# Patient Record
Sex: MALE | Race: WHITE | Employment: FULL TIME | URBAN - METROPOLITAN AREA
[De-identification: names, ages, dates, MRNs, and addresses within clinical notes are randomized per-mention and may not be internally consistent; named-entity substitution may affect disease eponyms.]

---

## 2018-08-19 ENCOUNTER — HOSPITAL ENCOUNTER (OUTPATIENT)
Age: 38
Discharge: HOME | End: 2018-08-19
Attending: EMERGENCY MEDICINE
Payer: COMMERCIAL

## 2018-08-19 VITALS
BODY MASS INDEX: 42.66 KG/M2 | RESPIRATION RATE: 20 BRPM | WEIGHT: 315 LBS | OXYGEN SATURATION: 100 % | HEART RATE: 92 BPM | DIASTOLIC BLOOD PRESSURE: 71 MMHG | HEIGHT: 72 IN | SYSTOLIC BLOOD PRESSURE: 151 MMHG | TEMPERATURE: 98 F

## 2018-08-19 DIAGNOSIS — L03.019 ONYCHIA AND PARONYCHIA OF FINGER: Primary | ICD-10-CM

## 2018-08-19 PROCEDURE — 10060 I&D ABSCESS SIMPLE/SINGLE: CPT

## 2018-08-19 PROCEDURE — 87070 CULTURE OTHR SPECIMN AEROBIC: CPT | Performed by: EMERGENCY MEDICINE

## 2018-08-19 PROCEDURE — 87147 CULTURE TYPE IMMUNOLOGIC: CPT | Performed by: EMERGENCY MEDICINE

## 2018-08-19 PROCEDURE — 99213 OFFICE O/P EST LOW 20 MIN: CPT

## 2018-08-19 PROCEDURE — 87205 SMEAR GRAM STAIN: CPT | Performed by: EMERGENCY MEDICINE

## 2018-08-19 PROCEDURE — 99214 OFFICE O/P EST MOD 30 MIN: CPT

## 2018-08-19 PROCEDURE — 87186 SC STD MICRODIL/AGAR DIL: CPT | Performed by: EMERGENCY MEDICINE

## 2018-08-19 RX ORDER — AMLODIPINE BESYLATE 10 MG/1
10 TABLET ORAL DAILY
COMMUNITY

## 2018-08-19 RX ORDER — CEFADROXIL 500 MG/1
500 CAPSULE ORAL 2 TIMES DAILY
Qty: 20 CAPSULE | Refills: 0 | Status: SHIPPED | OUTPATIENT
Start: 2018-08-19 | End: 2018-08-29

## 2018-08-19 NOTE — ED INITIAL ASSESSMENT (HPI)
PATIENT ARRIVED AMBULATORY TO ROOM C/O A PARONYCHIA TO THE 2ND DIGIT ON THE LEFT FINGER. NO DRAINAGE. NO FEVERS.

## 2018-08-19 NOTE — ED PROVIDER NOTES
Patient Seen in: [facility]    History   Patient presents with:  Finger Pain    Stated Complaint: Skin problem    HPI    Patient complains of a painful swollen left index finger.   The patient denies any recent trauma to t was placed    MDM   We will place the patient on a 10 day course of cephalosporin antibiotic as patient does have erythema and swelling proximal to the nail fold over  the distal phalanx and may have a early mild finger cellulitis present            Dispos

## 2018-09-04 ENCOUNTER — HOSPITAL ENCOUNTER (OUTPATIENT)
Age: 38
Discharge: HOME | End: 2018-09-04
Attending: EMERGENCY MEDICINE
Payer: COMMERCIAL

## 2018-09-04 VITALS
SYSTOLIC BLOOD PRESSURE: 127 MMHG | HEART RATE: 100 BPM | TEMPERATURE: 99 F | DIASTOLIC BLOOD PRESSURE: 66 MMHG | BODY MASS INDEX: 42.66 KG/M2 | OXYGEN SATURATION: 96 % | WEIGHT: 315 LBS | RESPIRATION RATE: 20 BRPM | HEIGHT: 72 IN

## 2018-09-04 DIAGNOSIS — E11.65 TYPE 2 DIABETES MELLITUS WITH HYPERGLYCEMIA, WITHOUT LONG-TERM CURRENT USE OF INSULIN (HCC): ICD-10-CM

## 2018-09-04 DIAGNOSIS — J02.0 STREP PHARYNGITIS: Primary | ICD-10-CM

## 2018-09-04 LAB
GLUCOSE BLDC GLUCOMTR-MCNC: 219 MG/DL (ref 70–99)
S PYO AG THROAT QL: POSITIVE

## 2018-09-04 PROCEDURE — 82962 GLUCOSE BLOOD TEST: CPT

## 2018-09-04 PROCEDURE — 99213 OFFICE O/P EST LOW 20 MIN: CPT

## 2018-09-04 PROCEDURE — 99214 OFFICE O/P EST MOD 30 MIN: CPT

## 2018-09-04 PROCEDURE — 87430 STREP A AG IA: CPT

## 2018-09-04 RX ORDER — AMOXICILLIN 875 MG/1
875 TABLET, COATED ORAL 2 TIMES DAILY
Qty: 20 TABLET | Refills: 0 | Status: SHIPPED | OUTPATIENT
Start: 2018-09-04 | End: 2018-09-14

## 2018-09-04 RX ORDER — HYDROCHLOROTHIAZIDE 12.5 MG/1
12.5 TABLET ORAL DAILY
COMMUNITY

## 2018-09-04 RX ORDER — IRBESARTAN 150 MG/1
150 TABLET ORAL DAILY
COMMUNITY

## 2018-09-04 NOTE — ED PROVIDER NOTES
Patient Seen in: [facility]    History   Patient presents with:  Cough/URI    Stated Complaint: COUGH/CONGESTION    HPI    The patient is a 26-year-old male with past history of hypertension, diabetes who presents now wit and rhythm without murmur  Abdomen: Soft, nontender and nondistended  Neurologic: Patient is awake, alert and oriented ×3.   The patient's motor strength is 5 out of 5 and symmetric in the upper and lower extremities bilaterally  Extremities: No focal swell

## 2022-09-24 ENCOUNTER — HOSPITAL ENCOUNTER (OUTPATIENT)
Age: 42
Discharge: OTHER INSTITUTION | End: 2022-09-24

## 2022-09-24 VITALS
OXYGEN SATURATION: 98 % | RESPIRATION RATE: 18 BRPM | TEMPERATURE: 98 F | DIASTOLIC BLOOD PRESSURE: 71 MMHG | SYSTOLIC BLOOD PRESSURE: 155 MMHG | HEART RATE: 102 BPM

## 2022-09-24 DIAGNOSIS — L03.115 CELLULITIS OF RIGHT LOWER EXTREMITY: Primary | ICD-10-CM

## 2022-09-24 PROCEDURE — 99204 OFFICE O/P NEW MOD 45 MIN: CPT

## 2022-09-24 PROCEDURE — 99203 OFFICE O/P NEW LOW 30 MIN: CPT

## 2022-09-24 NOTE — ED INITIAL ASSESSMENT (HPI)
Patient reports he was started on doxycycline on Wednesday for a staph infection to right lower leg. Noted palms, feet and lip itchiness and swelling this am.  Provider at bedside.

## 2024-07-26 ENCOUNTER — HOSPITAL ENCOUNTER (INPATIENT)
Facility: HOSPITAL | Age: 44
LOS: 1 days | Discharge: HOME | End: 2024-07-27
Attending: EMERGENCY MEDICINE | Admitting: HOSPITALIST
Payer: COMMERCIAL

## 2024-07-26 DIAGNOSIS — L03.115 CELLULITIS OF RIGHT LOWER EXTREMITY: Primary | ICD-10-CM

## 2024-07-26 PROBLEM — L03.90 CELLULITIS: Status: ACTIVE | Noted: 2024-07-26

## 2024-07-26 LAB
ALBUMIN SERPL-MCNC: 4.5 G/DL (ref 3.2–4.8)
ALBUMIN/GLOB SERPL: 1.4 {RATIO} (ref 1–2)
ALP LIVER SERPL-CCNC: 74 U/L
ALT SERPL-CCNC: 10 U/L
ANION GAP SERPL CALC-SCNC: 9 MMOL/L (ref 0–18)
AST SERPL-CCNC: 15 U/L (ref ?–34)
BASOPHILS # BLD AUTO: 0.05 X10(3) UL (ref 0–0.2)
BASOPHILS NFR BLD AUTO: 0.2 %
BILIRUB SERPL-MCNC: 0.9 MG/DL (ref 0.3–1.2)
BUN BLD-MCNC: 17 MG/DL (ref 9–23)
BUN/CREAT SERPL: 18.9 (ref 10–20)
CALCIUM BLD-MCNC: 9.5 MG/DL (ref 8.7–10.4)
CHLORIDE SERPL-SCNC: 101 MMOL/L (ref 98–112)
CO2 SERPL-SCNC: 25 MMOL/L (ref 21–32)
CREAT BLD-MCNC: 0.9 MG/DL
DEPRECATED RDW RBC AUTO: 39.9 FL (ref 35.1–46.3)
EGFRCR SERPLBLD CKD-EPI 2021: 108 ML/MIN/1.73M2 (ref 60–?)
EOSINOPHIL # BLD AUTO: 0.01 X10(3) UL (ref 0–0.7)
EOSINOPHIL NFR BLD AUTO: 0 %
ERYTHROCYTE [DISTWIDTH] IN BLOOD BY AUTOMATED COUNT: 13 % (ref 11–15)
GLOBULIN PLAS-MCNC: 3.3 G/DL (ref 2–3.5)
GLUCOSE BLD-MCNC: 175 MG/DL (ref 70–99)
GLUCOSE BLDC GLUCOMTR-MCNC: 213 MG/DL (ref 70–99)
HCT VFR BLD AUTO: 41.8 %
HGB BLD-MCNC: 14.7 G/DL
IMM GRANULOCYTES # BLD AUTO: 0.16 X10(3) UL (ref 0–1)
IMM GRANULOCYTES NFR BLD: 0.6 %
LACTATE SERPL-SCNC: 0.9 MMOL/L (ref 0.5–2)
LYMPHOCYTES # BLD AUTO: 0.92 X10(3) UL (ref 1–4)
LYMPHOCYTES NFR BLD AUTO: 3.5 %
MCH RBC QN AUTO: 29.7 PG (ref 26–34)
MCHC RBC AUTO-ENTMCNC: 35.2 G/DL (ref 31–37)
MCV RBC AUTO: 84.4 FL
MONOCYTES # BLD AUTO: 0.94 X10(3) UL (ref 0.1–1)
MONOCYTES NFR BLD AUTO: 3.6 %
NEUTROPHILS # BLD AUTO: 23.91 X10 (3) UL (ref 1.5–7.7)
NEUTROPHILS # BLD AUTO: 23.91 X10(3) UL (ref 1.5–7.7)
NEUTROPHILS NFR BLD AUTO: 92.1 %
OSMOLALITY SERPL CALC.SUM OF ELEC: 286 MOSM/KG (ref 275–295)
PLATELET # BLD AUTO: 250 10(3)UL (ref 150–450)
POTASSIUM SERPL-SCNC: 3.4 MMOL/L (ref 3.5–5.1)
PROT SERPL-MCNC: 7.8 G/DL (ref 5.7–8.2)
RBC # BLD AUTO: 4.95 X10(6)UL
SODIUM SERPL-SCNC: 135 MMOL/L (ref 136–145)
WBC # BLD AUTO: 26 X10(3) UL (ref 4–11)

## 2024-07-26 PROCEDURE — 99223 1ST HOSP IP/OBS HIGH 75: CPT | Performed by: HOSPITALIST

## 2024-07-26 RX ORDER — PROCHLORPERAZINE EDISYLATE 5 MG/ML
5 INJECTION INTRAMUSCULAR; INTRAVENOUS EVERY 8 HOURS PRN
Status: DISCONTINUED | OUTPATIENT
Start: 2024-07-26 | End: 2024-07-27

## 2024-07-26 RX ORDER — MULTIVIT-MIN/IRON FUM/FOLIC AC 7.5 MG-4
1 TABLET ORAL DAILY
COMMUNITY

## 2024-07-26 RX ORDER — FUROSEMIDE 40 MG/1
40 TABLET ORAL DAILY
COMMUNITY

## 2024-07-26 RX ORDER — NICOTINE POLACRILEX 4 MG
15 LOZENGE BUCCAL
Status: DISCONTINUED | OUTPATIENT
Start: 2024-07-26 | End: 2024-07-27

## 2024-07-26 RX ORDER — BRIMONIDINE TARTRATE 1.5 MG/ML
1 SOLUTION/ DROPS OPHTHALMIC 2 TIMES DAILY
Status: DISCONTINUED | OUTPATIENT
Start: 2024-07-26 | End: 2024-07-27

## 2024-07-26 RX ORDER — DORZOLAMIDE HYDROCHLORIDE AND TIMOLOL MALEATE 20; 5 MG/ML; MG/ML
1 SOLUTION/ DROPS OPHTHALMIC 2 TIMES DAILY
Status: DISCONTINUED | OUTPATIENT
Start: 2024-07-26 | End: 2024-07-27

## 2024-07-26 RX ORDER — VANCOMYCIN 2 GRAM/500 ML IN 0.9 % SODIUM CHLORIDE INTRAVENOUS
2000 ONCE
Status: DISCONTINUED | OUTPATIENT
Start: 2024-07-26 | End: 2024-07-26

## 2024-07-26 RX ORDER — DORZOLAMIDE HYDROCHLORIDE AND TIMOLOL MALEATE 20; 5 MG/ML; MG/ML
1 SOLUTION/ DROPS OPHTHALMIC 2 TIMES DAILY
COMMUNITY

## 2024-07-26 RX ORDER — LATANOPROST 50 UG/ML
1 SOLUTION/ DROPS OPHTHALMIC NIGHTLY
Status: DISCONTINUED | OUTPATIENT
Start: 2024-07-26 | End: 2024-07-27

## 2024-07-26 RX ORDER — ONDANSETRON 2 MG/ML
4 INJECTION INTRAMUSCULAR; INTRAVENOUS EVERY 6 HOURS PRN
Status: DISCONTINUED | OUTPATIENT
Start: 2024-07-26 | End: 2024-07-27

## 2024-07-26 RX ORDER — SODIUM CHLORIDE 9 MG/ML
INJECTION, SOLUTION INTRAVENOUS CONTINUOUS
Status: DISCONTINUED | OUTPATIENT
Start: 2024-07-26 | End: 2024-07-27

## 2024-07-26 RX ORDER — NICOTINE POLACRILEX 4 MG
30 LOZENGE BUCCAL
Status: DISCONTINUED | OUTPATIENT
Start: 2024-07-26 | End: 2024-07-27

## 2024-07-26 RX ORDER — MULTIVIT WITH MINERALS/LUTEIN
1000 TABLET ORAL DAILY
COMMUNITY

## 2024-07-26 RX ORDER — LATANOPROST 50 UG/ML
1 SOLUTION/ DROPS OPHTHALMIC NIGHTLY
COMMUNITY

## 2024-07-26 RX ORDER — SODIUM CHLORIDE 9 MG/ML
125 INJECTION, SOLUTION INTRAVENOUS CONTINUOUS
Status: DISCONTINUED | OUTPATIENT
Start: 2024-07-26 | End: 2024-07-27

## 2024-07-26 RX ORDER — BRIMONIDINE TARTRATE 1.5 MG/ML
1 SOLUTION/ DROPS OPHTHALMIC 2 TIMES DAILY
COMMUNITY

## 2024-07-26 RX ORDER — ACETAMINOPHEN 500 MG
500 TABLET ORAL EVERY 4 HOURS PRN
Status: DISCONTINUED | OUTPATIENT
Start: 2024-07-26 | End: 2024-07-27

## 2024-07-26 RX ORDER — CLINDAMYCIN PHOSPHATE 600 MG/50ML
600 INJECTION, SOLUTION INTRAVENOUS ONCE
Status: COMPLETED | OUTPATIENT
Start: 2024-07-26 | End: 2024-07-26

## 2024-07-26 RX ORDER — POTASSIUM CHLORIDE 20 MEQ/1
40 TABLET, EXTENDED RELEASE ORAL ONCE
Status: COMPLETED | OUTPATIENT
Start: 2024-07-26 | End: 2024-07-26

## 2024-07-26 RX ORDER — TEMAZEPAM 15 MG/1
15 CAPSULE ORAL NIGHTLY PRN
Status: DISCONTINUED | OUTPATIENT
Start: 2024-07-26 | End: 2024-07-27

## 2024-07-26 RX ORDER — DEXTROSE MONOHYDRATE 25 G/50ML
50 INJECTION, SOLUTION INTRAVENOUS
Status: DISCONTINUED | OUTPATIENT
Start: 2024-07-26 | End: 2024-07-27

## 2024-07-26 RX ORDER — CLINDAMYCIN PHOSPHATE 600 MG/50ML
600 INJECTION, SOLUTION INTRAVENOUS EVERY 8 HOURS
Status: DISCONTINUED | OUTPATIENT
Start: 2024-07-27 | End: 2024-07-27

## 2024-07-26 RX ORDER — ENOXAPARIN SODIUM 100 MG/ML
40 INJECTION SUBCUTANEOUS DAILY
Status: DISCONTINUED | OUTPATIENT
Start: 2024-07-27 | End: 2024-07-27

## 2024-07-26 RX ORDER — POTASSIUM CHLORIDE 1500 MG/1
20 TABLET, EXTENDED RELEASE ORAL DAILY
COMMUNITY

## 2024-07-26 NOTE — ED PROVIDER NOTES
Patient Seen in: [facility] Emergency Department      History     Chief Complaint   Patient presents with    Fever    Leg Pain     Stated Complaint: rt leg pain, Hx cellulitis    Subjective:   HPI  43-year-old male with hypertension, diabetes, obesity, history of recurrent lower extremity cellulitis, also with bilateral lower extremity lymphedema, presents for evaluation of fever, right leg pain, warmth and swelling.  Symptoms started earlier today.  He had a fever of 101 Fahrenheit at home and took ibuprofen at 3:45 PM.  He also notes that his heart was racing.  He called his PCP who started him on clindamycin and he took 1 dose today.  He came in for persistent elevated heart rate as he has had sepsis in the past.        Objective:   Past Medical History:    Diabetes (HCC)    Essential hypertension    Glaucoma    High blood pressure              Past Surgical History:   Procedure Laterality Date    Eye surgery      Fracture surgery                  Social History     Socioeconomic History    Marital status:    Tobacco Use    Smoking status: Never    Smokeless tobacco: Never     Social Determinants of Health     Food Insecurity: No Food Insecurity (7/26/2024)    Food Insecurity     Food Insecurity: Never true   Transportation Needs: No Transportation Needs (7/26/2024)    Transportation Needs     Lack of Transportation: No   Housing Stability: Low Risk  (7/26/2024)    Housing Stability     Housing Instability: No              Review of Systems    Positive for stated Chief Complaint: Fever and Leg Pain    Other systems are as noted in HPI.  Constitutional and vital signs reviewed.      All other systems reviewed and negative except as noted above.    Physical Exam     ED Triage Vitals   BP 07/26/24 1739 121/80   Pulse 07/26/24 1736 120   Resp 07/26/24 1736 18   Temp 07/26/24 1736 99.7 °F (37.6 °C)   Temp src 07/26/24 1736 Oral   SpO2 07/26/24 1736 95 %   O2 Device 07/26/24 1736 None (Room air)        Current Vitals:   Vital Signs  BP: 159/85  Pulse: 111  Resp: 20  Temp: 98.6 °F (37 °C)  Temp src: Oral  MAP (mmHg): (!) 106    Oxygen Therapy  SpO2: 95 %  O2 Device: None (Room air)            Physical Exam  Vitals and nursing note reviewed.   Constitutional:       Appearance: He is well-developed.   HENT:      Head: Normocephalic and atraumatic.   Eyes:      Extraocular Movements: Extraocular movements intact.   Cardiovascular:      Rate and Rhythm: Regular rhythm. Tachycardia present.      Heart sounds: Normal heart sounds.   Pulmonary:      Effort: Pulmonary effort is normal.      Breath sounds: Normal breath sounds.   Abdominal:      General: There is no distension.      Palpations: Abdomen is soft.      Tenderness: There is no abdominal tenderness.   Musculoskeletal:         General: Normal range of motion.      Cervical back: Normal range of motion.   Skin:     General: Skin is warm.      Capillary Refill: Capillary refill takes less than 2 seconds.      Comments: At the right posterior distal thigh there is approximately 7 x 5 cm area of erythema and warmth that does not extend to the popliteal fossa   Neurological:      Mental Status: He is alert.      Comments: No focal deficits       Differential diagnosis includes but is not limited to cellulitis, sepsis, bacteremia, less likely DVT        ED Course     Labs Reviewed   COMP METABOLIC PANEL (14) - Abnormal; Notable for the following components:       Result Value    Glucose 175 (*)     Sodium 135 (*)     Potassium 3.4 (*)     All other components within normal limits   POCT GLUCOSE - Abnormal; Notable for the following components:    POC Glucose  213 (*)     All other components within normal limits   CBC W/ DIFFERENTIAL - Abnormal; Notable for the following components:    WBC 26.0 (*)     Neutrophil Absolute Prelim 23.91 (*)     Neutrophil Absolute 23.91 (*)     Lymphocyte Absolute 0.92 (*)     All other components within normal limits   LACTIC  ACID, PLASMA - Normal   CBC WITH DIFFERENTIAL WITH PLATELET    Narrative:     The following orders were created for panel order CBC With Differential With Platelet.  Procedure                               Abnormality         Status                     ---------                               -----------         ------                     CBC W/ DIFFERENTIAL[[id number]]          Abnormal            Final result                 Please view results for these tests on the individual orders.   HEMOGLOBIN A1C   BLOOD CULTURE   BLOOD CULTURE                      MDM                                     Medical Decision Making    Patient well-appearing and ambulatory in the ED.  CMP with hyperglycemia without gap or acidosis.  CBC with significant leukocytosis and left shift.  Lactic acid normal.  Blood cultures pending.  He was treated with fluids and clindamycin as this has improved his cellulitis in the past.  Discussed with Dr. Baires for ID consultation and Dr. Finch for admission to the hospital.      Problems Addressed:  Cellulitis of right lower extremity: complicated acute illness or injury with systemic symptoms    Amount and/or Complexity of Data Reviewed  Labs: ordered. Decision-making details documented in ED Course.  Discussion of management or test interpretation with external provider(s): As above    Risk  Decision regarding hospitalization.        Disposition and Plan     Clinical Impression:  1. Cellulitis of right lower extremity         Disposition:  Admit  7/26/2024  7:30 pm    Follow-up:  No follow-up provider specified.        Medications Prescribed:  Current Discharge Medication List                            Hospital Problems       Present on Admission  Date Reviewed: 9/24/2022            ICD-10-CM Noted POA    * (Principal) Cellulitis of right lower extremity L03.115 7/26/2024 Unknown    Cellulitis L03.90 7/26/2024 Unknown

## 2024-07-26 NOTE — ED INITIAL ASSESSMENT (HPI)
Patient presents to the ED c/o right leg pain, fever, and tachycardia since this morning around 1000. Pt states he checked his pulse at home and it was 118 bpm, pt also reports taking his temperature at home which was 101.  Ibuprofen at 1545.

## 2024-07-27 VITALS
OXYGEN SATURATION: 97 % | RESPIRATION RATE: 18 BRPM | WEIGHT: 315 LBS | TEMPERATURE: 99 F | HEART RATE: 80 BPM | DIASTOLIC BLOOD PRESSURE: 88 MMHG | BODY MASS INDEX: 65 KG/M2 | SYSTOLIC BLOOD PRESSURE: 136 MMHG

## 2024-07-27 LAB
ANION GAP SERPL CALC-SCNC: 6 MMOL/L (ref 0–18)
BASOPHILS # BLD AUTO: 0.02 X10(3) UL (ref 0–0.2)
BASOPHILS NFR BLD AUTO: 0.2 %
BUN BLD-MCNC: 14 MG/DL (ref 9–23)
BUN/CREAT SERPL: 18.7 (ref 10–20)
CALCIUM BLD-MCNC: 8.7 MG/DL (ref 8.7–10.4)
CHLORIDE SERPL-SCNC: 103 MMOL/L (ref 98–112)
CO2 SERPL-SCNC: 27 MMOL/L (ref 21–32)
CREAT BLD-MCNC: 0.75 MG/DL
DEPRECATED RDW RBC AUTO: 40.6 FL (ref 35.1–46.3)
EGFRCR SERPLBLD CKD-EPI 2021: 114 ML/MIN/1.73M2 (ref 60–?)
EOSINOPHIL # BLD AUTO: 0.03 X10(3) UL (ref 0–0.7)
EOSINOPHIL NFR BLD AUTO: 0.3 %
ERYTHROCYTE [DISTWIDTH] IN BLOOD BY AUTOMATED COUNT: 13 % (ref 11–15)
EST. AVERAGE GLUCOSE BLD GHB EST-MCNC: 232 MG/DL (ref 68–126)
GLUCOSE BLD-MCNC: 203 MG/DL (ref 70–99)
GLUCOSE BLDC GLUCOMTR-MCNC: 151 MG/DL (ref 70–99)
GLUCOSE BLDC GLUCOMTR-MCNC: 178 MG/DL (ref 70–99)
HBA1C MFR BLD: 9.7 % (ref ?–5.7)
HCT VFR BLD AUTO: 40.5 %
HGB BLD-MCNC: 14.1 G/DL
IMM GRANULOCYTES # BLD AUTO: 0.05 X10(3) UL (ref 0–1)
IMM GRANULOCYTES NFR BLD: 0.4 %
LYMPHOCYTES # BLD AUTO: 1.35 X10(3) UL (ref 1–4)
LYMPHOCYTES NFR BLD AUTO: 11.8 %
MCH RBC QN AUTO: 29.6 PG (ref 26–34)
MCHC RBC AUTO-ENTMCNC: 34.8 G/DL (ref 31–37)
MCV RBC AUTO: 85.1 FL
MONOCYTES # BLD AUTO: 0.79 X10(3) UL (ref 0.1–1)
MONOCYTES NFR BLD AUTO: 6.9 %
NEUTROPHILS # BLD AUTO: 9.22 X10 (3) UL (ref 1.5–7.7)
NEUTROPHILS # BLD AUTO: 9.22 X10(3) UL (ref 1.5–7.7)
NEUTROPHILS NFR BLD AUTO: 80.4 %
OSMOLALITY SERPL CALC.SUM OF ELEC: 288 MOSM/KG (ref 275–295)
PLATELET # BLD AUTO: 211 10(3)UL (ref 150–450)
POTASSIUM SERPL-SCNC: 3.7 MMOL/L (ref 3.5–5.1)
POTASSIUM SERPL-SCNC: 3.7 MMOL/L (ref 3.5–5.1)
RBC # BLD AUTO: 4.76 X10(6)UL
SODIUM SERPL-SCNC: 136 MMOL/L (ref 136–145)
WBC # BLD AUTO: 11.5 X10(3) UL (ref 4–11)

## 2024-07-27 PROCEDURE — 99239 HOSP IP/OBS DSCHRG MGMT >30: CPT | Performed by: STUDENT IN AN ORGANIZED HEALTH CARE EDUCATION/TRAINING PROGRAM

## 2024-07-27 RX ORDER — POTASSIUM CHLORIDE 20 MEQ/1
20 TABLET, EXTENDED RELEASE ORAL DAILY
Status: DISCONTINUED | OUTPATIENT
Start: 2024-07-27 | End: 2024-07-27

## 2024-07-27 RX ORDER — ENOXAPARIN SODIUM 100 MG/ML
100 INJECTION SUBCUTANEOUS 2 TIMES DAILY
Status: DISCONTINUED | OUTPATIENT
Start: 2024-07-27 | End: 2024-07-27

## 2024-07-27 RX ORDER — ASCORBIC ACID 500 MG
1000 TABLET ORAL DAILY
Status: DISCONTINUED | OUTPATIENT
Start: 2024-07-27 | End: 2024-07-27

## 2024-07-27 RX ORDER — FUROSEMIDE 40 MG/1
40 TABLET ORAL DAILY
Status: DISCONTINUED | OUTPATIENT
Start: 2024-07-27 | End: 2024-07-27

## 2024-07-27 RX ORDER — AMLODIPINE BESYLATE 10 MG/1
10 TABLET ORAL DAILY
Status: DISCONTINUED | OUTPATIENT
Start: 2024-07-27 | End: 2024-07-27

## 2024-07-27 RX ORDER — LOSARTAN POTASSIUM 50 MG/1
50 TABLET ORAL DAILY
Status: DISCONTINUED | OUTPATIENT
Start: 2024-07-27 | End: 2024-07-27

## 2024-07-27 RX ORDER — HYDROCHLOROTHIAZIDE 12.5 MG/1
12.5 TABLET ORAL DAILY
Status: DISCONTINUED | OUTPATIENT
Start: 2024-07-27 | End: 2024-07-27

## 2024-07-27 RX ORDER — ENOXAPARIN SODIUM 150 MG/ML
0.5 INJECTION SUBCUTANEOUS DAILY
Status: DISCONTINUED | OUTPATIENT
Start: 2024-07-27 | End: 2024-07-27

## 2024-07-27 RX ORDER — CLINDAMYCIN HYDROCHLORIDE 300 MG/1
300 CAPSULE ORAL 3 TIMES DAILY
Qty: 30 CAPSULE | Refills: 0 | Status: SHIPPED | OUTPATIENT
Start: 2024-07-27 | End: 2024-08-06

## 2024-07-27 NOTE — H&P
[facility]    PATIENT'S NAME: JF GALLEGOS JR.   ATTENDING PHYSICIAN: Duncan Finch MD   PATIENT ACCOUNT#:   [id number]    LOCATION:  [address] [place] [address]  MEDICAL RECORD #:   [id number]       YOB: 1980  ADMISSION DATE:       07/26/2024    HISTORY AND PHYSICAL EXAMINATION    CHIEF COMPLAINT:  Right lower extremity cellulitis.    HISTORY OF PRESENT ILLNESS:  Patient is a 43-year-old  male with morbid obesity, diabetes mellitus type 2, and chronic stasis dermatitis with recurrent cellulitis, started noticing increased erythema and inflammation in his right distal leg and right medial thigh yesterday.  He was given a prescription for clindamycin which he took the first dose this morning.  He went to work and started having diaphoresis and feeling aches and pains all over his body, came into the emergency department for evaluation.  Temperature was 99.6, pulse was 125, tachycardia.  Lactic acid was negative.  CBC showed white blood cell count of 26.0 with left shift.  Chemistry:  Potassium 3.4, glucose 175.  Blood cultures were obtained.  Started on IV clindamycin.  He will be admitted to the hospital for further management.    PAST MEDICAL HISTORY:  Morbid obesity, diabetes mellitus type 2, chronic stasis dermatitis both lower extremities.  He had history of stasis ulcer with pseudomonas positive culture while he was having an ulcer.  He usually gets recurrent cellulitis in his legs and he says clindamycin usually works for him.     PAST SURGICAL HISTORY:  He had retinal detachment procedure and right ankle open reduction and internal fixation.    MEDICATIONS:  Please see medication reconciliation list.    ALLERGIES:  Shellfish.     FAMILY HISTORY:  Positive for hypertension and diabetes mellitus type 2.    SOCIAL HISTORY:  No tobacco, alcohol, or drug use.  Lives with his family.  Independent in his basic activities of daily living.    REVIEW OF SYSTEMS:  Patient reports  yesterday started developing increased erythema and inflammation in his distal right leg and some streaking in the medial aspect of his right thigh.  He was given a prescription for clindamycin as outlined above.  Today, he felt body aches and subjective fevers and decided to come in to the emergency department for evaluation.      PHYSICAL EXAMINATION:    GENERAL:  Alert and oriented to time, place, and person.  No acute distress.  VITAL SIGNS:  Temperature 99.7, pulse 105, respiratory rate 18, blood pressure 110/72, pulse ox 94% on room air.  HEENT:  Atraumatic.  Oropharynx clear.  Moist mucous membranes.  Ears and nose are normal.  Eyes:  Anicteric sclerae.  NECK:  Supple.  No lymphadenopathy.  Trachea midline.  Full range of motion.  LUNGS:  Clear to auscultation bilaterally.  Normal respiratory effort.  HEART:  Regular rate and rhythm.  S1, S2 auscultated.  No murmur.  ABDOMEN:  Soft, nondistended.  Obese.  Positive bowel sounds.  EXTREMITIES:  Stasis dermatitis both lower extremities.  Right distal leg erythema and mild streaking to the medial and posterior right thigh pannus.   NEUROLOGIC:  Motor and sensory intact.     ASSESSMENT AND PLAN:    1.   Right lower extremity cellulitis.  2.   Leukocytosis, rule out sepsis.  3.   Morbid obesity.  4.   Diabetes mellitus type 2.    Patient will be admitted to general medical floor.  Blood cultures.  IV clindamycin.  Infectious Disease consult.  Monitor Accu-Cheks.  Monitor hemodynamic status.  Further recommendations to follow.    Dictated By Duncan Finch MD  d: 07/26/2024 20:09:21  t: 07/26/2024 22:30:32  Job [id number]  FB/

## 2024-07-27 NOTE — CONSULTS
[facility]  part of [facility] ID CONSULT NOTE    Rajendra Coronado Jr. Patient Status:  Inpatient    1980 MRN [id number]   Location [facility] 5SW/SE Attending Duncan Finch MD   Hosp Day # 1 PCP RUDI HO       Reason for Consultation:  RLE cellulitis    ASSESSMENT:    Antibiotics: Clindamycin    # Acute RLE cellulitis with leukocytosis, R/o bacteremia  # Leukocytosis   # Diabetes mellitus  # Morbid obesity    PLAN:  -  Continue on clindamycin.  -  FU cx.  -  Leg elevation.  -  Follow fever curve, wbc.  -  Reviewed labs, micro, imaging reports, available old records.  -  Case d/w patient, RN.    History of Present Illness:  Rajendra Coronado Jr. is a 43 year old male with a history of morbid obesity, diabetes mellitus, HTN, with history of cellulitis in RLE with last hospitalization 2022 at OSH who presented to [facility] ED on  with fever. Noticed that his right leg was getting more swollen yesterday morning and took a dose of clindamycin but then had a fever at home so came to the ED. On arrival, Tmax 99.7, wbc 26, blood cx obtained, started on clindamycin. States that clindamycin works best for him. Of note, per records from OSH admission 2022, patient was on ancef inpatient and discharged on dalvance. ID consulted.    History:  Past Medical History:    Diabetes (HCC)    Essential hypertension    Glaucoma    High blood pressure     Past Surgical History:   Procedure Laterality Date    Eye surgery      Fracture surgery       Family History   Problem Relation Age of Onset    Heart Disorder Father       reports that he has never smoked. He has never used smokeless tobacco.    Allergies:  Allergies   Allergen Reactions    Shellfish Allergy ANAPHYLAXIS       Medications:    Current Facility-Administered Medications:     sodium chloride 0.9% infusion, 125 mL/hr, Intravenous, Continuous    clindamycin phosphate in dextrose 5% (Cleocin) 600 mg/50mL  IVPB premix 600 mg, 600 mg, Intravenous, Q8H    glucose (Dex4) 15 GM/59ML oral liquid 15 g, 15 g, Oral, Q15 Min PRN **OR** glucose (Glutose) 40% oral gel 15 g, 15 g, Oral, Q15 Min PRN **OR** glucose-vitamin C (Dex-4) chewable tab 4 tablet, 4 tablet, Oral, Q15 Min PRN **OR** dextrose 50% injection 50 mL, 50 mL, Intravenous, Q15 Min PRN **OR** glucose (Dex4) 15 GM/59ML oral liquid 30 g, 30 g, Oral, Q15 Min PRN **OR** glucose (Glutose) 40% oral gel 30 g, 30 g, Oral, Q15 Min PRN **OR** glucose-vitamin C (Dex-4) chewable tab 8 tablet, 8 tablet, Oral, Q15 Min PRN    insulin aspart (NovoLOG) 100 Units/mL FlexPen 1-11 Units, 1-11 Units, Subcutaneous, TID CC    sodium chloride 0.9% infusion, , Intravenous, Continuous    enoxaparin (Lovenox) 40 MG/0.4ML SUBQ injection 40 mg, 40 mg, Subcutaneous, Daily    acetaminophen (Tylenol Extra Strength) tab 500 mg, 500 mg, Oral, Q4H PRN    ondansetron (Zofran) 4 MG/2ML injection 4 mg, 4 mg, Intravenous, Q6H PRN    prochlorperazine (Compazine) 10 MG/2ML injection 5 mg, 5 mg, Intravenous, Q8H PRN    temazepam (Restoril) cap 15 mg, 15 mg, Oral, Nightly PRN    dorzolamide-timolol (Cosopt) 2-0.5 % ophthalmic solution 1 drop, 1 drop, Left Eye, BID    latanoprost (Xalatan) 0.005 % ophthalmic solution 1 drop, 1 drop, Left Eye, Nightly    brimonidine (Alphagan) 0.15 % ophthalmic solution 1 drop -pt's own medication, 1 drop, Left Eye, BID    Review of Systems:  CONSTITUTIONAL:  No weight loss, weakness or fatigue.  HEENT:  Eyes:  No visual loss, blurred vision, double vision or yellow sclerae. Ears, Nose, Throat:  No hearing loss, sneezing, congestion, runny nose or sore throat.  SKIN:  No rash or itching.  CARDIOVASCULAR:  No chest pain, chest pressure or chest discomfort  RESPIRATORY:  No shortness of breath, cough or sputum.  GASTROINTESTINAL:  No anorexia, nausea, vomiting or diarrhea. No abdominal pain or blood.  GENITOURINARY:  No Burning on urination.   NEUROLOGICAL:  No headache,  dizziness, syncope, paralysis, ataxia, numbness or tingling in the extremities.  MUSCULOSKELETAL:  +RLE pain  HEMATOLOGIC:  No anemia, bleeding or bruising.  ALLERGIES:  No history of asthma, hives, eczema or rhinitis.    Physical Exam:  Vital signs: Blood pressure 115/77, pulse 88, temperature 98.7 °F (37.1 °C), temperature source Oral, resp. rate 18, weight (!) 480 lb (217.7 kg), SpO2 94%.    General: Awake, in chair  HEENT: Moist mucous membranes.   Neck: No lymphadenopathy.  Supple.  Cardiovascular: RRR  Respiratory: Clear to auscultation bilaterally.  No wheezes. No rhonchi.  Abdomen: Soft, obese  Musculoskeletal: No edema noted  Integument: R medial thigh erythema, warmth  Lines: PIV+    Laboratory Data:  Recent Labs   Lab 07/26/24  1824   RBC 4.95   HGB 14.7   HCT 41.8   MCV 84.4   MCH 29.7   MCHC 35.2   RDW 13.0   NEPRELIM 23.91*   WBC 26.0*   .0     Recent Labs   Lab 07/26/24  1822   *   BUN 17   CREATSERUM 0.90   CA 9.5   ALB 4.5   *   K 3.4*      CO2 25.0   ALKPHO 74   AST 15   ALT 10   BILT 0.9   TP 7.8       Microbiology: Reviewed in EMR    Radiology: Reviewed    Thank you for allowing us to participate in the care of this patient. Please do not hesitate to call if you have any questions.   We will continue to follow with you and will make further recommendations based on his progress.    [facility]  [phone]  7/27/2024

## 2024-07-27 NOTE — PLAN OF CARE
Vitals as documented. Continued on IV abx while inpatient. Patient to discharge home w/ oral abx. Discharge and follow-up reviewed. All questions answered.       Problem: Patient Centered Care  Goal: Patient preferences are identified and integrated in the patient's plan of care  Description: Interventions:  - What would you like us to know as we care for you?   - Provide timely, complete, and accurate information to patient/family  - Incorporate patient and family knowledge, values, beliefs, and cultural backgrounds into the planning and delivery of care  - Encourage patient/family to participate in care and decision-making at the level they choose  - Honor patient and family perspectives and choices  Outcome: Adequate for Discharge     Problem: Diabetes/Glucose Control  Goal: Glucose maintained within prescribed range  Description: INTERVENTIONS:  - Monitor Blood Glucose as ordered  - Assess for signs and symptoms of hyperglycemia and hypoglycemia  - Administer ordered medications to maintain glucose within target range  - Assess barriers to adequate nutritional intake and initiate nutrition consult as needed  - Instruct patient on self management of diabetes  Outcome: Adequate for Discharge     Problem: Patient/Family Goals  Goal: Patient/Family Long Term Goal  Description: Patient's Long Term Goal: Go home    Interventions:  - Follow POC  - meds as ordered  - activity as tolerated  - See additional Care Plan goals for specific interventions  Outcome: Adequate for Discharge  Goal: Patient/Family Short Term Goal  Description: Patient's Short Term Goal: Cellulitis to resolve    Interventions:   - Follow POC  - meds as ordered  - Abx therapy  - See additional Care Plan goals for specific interventions  Outcome: Adequate for Discharge     Problem: PAIN - ADULT  Goal: Verbalizes/displays adequate comfort level or patient's stated pain goal  Description: INTERVENTIONS:  - Encourage pt to monitor pain and request  assistance  - Assess pain using appropriate pain scale  - Administer analgesics based on type and severity of pain and evaluate response  - Implement non-pharmacological measures as appropriate and evaluate response  - Consider cultural and social influences on pain and pain management  - Manage/alleviate anxiety  - Utilize distraction and/or relaxation techniques  - Monitor for opioid side effects  - Notify MD/LIP if interventions unsuccessful or patient reports new pain  - Anticipate increased pain with activity and pre-medicate as appropriate  Outcome: Adequate for Discharge     Problem: RISK FOR INFECTION - ADULT  Goal: Absence of fever/infection during anticipated neutropenic period  Description: INTERVENTIONS  - Monitor WBC  - Administer growth factors as ordered  - Implement neutropenic guidelines  Outcome: Adequate for Discharge

## 2024-07-27 NOTE — DISCHARGE SUMMARY
[facility]  part of [facility]    Discharge Summary    Rajendra Coronado Jr. Patient Status:  Inpatient    1980 MRN [id number]   Location [facility] 5SW/SE Attending Earlene Dia MD   Hosp Day # 1 PCP RUDI HO     Date of Admission: 2024   Date of Discharge: 2024    Admitting Diagnosis: Cellulitis of right lower extremity [L03.115]    Disposition: Home    Discharge Diagnosis: .Principal Problem:    Cellulitis of right lower extremity  Active Problems:    Cellulitis      Hospital Course:   Reason for Admission: Right lower extremity cellulitis     Discharge Physical Exam:     GENERAL:  Alert and oriented to time, place, and person.  No acute distress.  HEENT:  MMM  NECK:  Supple.  No lymphadenopathy.   LUNGS:  Clear to auscultation bilaterally.  Normal respiratory effort.  HEART:  Regular rate and rhythm.  S1, S2 auscultated.  No murmur.  ABDOMEN:  Soft, nondistended.  Obese.  Positive bowel sounds.  EXTREMITIES:  Stasis dermatitis both lower extremities.  Right distal leg erythema and mild streaking to the medial and posterior right thigh pannus.   NEUROLOGIC:  Motor and sensory intact.     Hospital Course:     Patient is a 43-year-old  male with morbid obesity, diabetes mellitus type 2, and chronic stasis dermatitis with recurrent cellulitis, started noticing increased erythema and inflammation in his right distal leg and right medial thigh yesterday.  He was given a prescription for clindamycin which he took the first dose this morning.  He went to work and started having diaphoresis and feeling aches and pains all over his body, came into the emergency department for evaluation.  Temperature was 99.6, pulse was 125, tachycardia.  Lactic acid was negative.  CBC showed white blood cell count of 26.0 with left shift.  Chemistry:  Potassium 3.4, glucose 175.  Blood cultures were obtained.  Started on IV clindamycin.  He was admitted to the  hospital for further management. Brief hospital course unremarkable, responded well to IV Clindamycin, with improvement in vitals and labs. ID was consulted and recommendations left. Patient requested to be discharged. ID was in agreement. Patient discharged with Rx for clindamycin 300mg TID x 10 days. Counseled on strict PCP follow up.     Complications: None    Consultants         Provider   Role Specialty     Cirilo Baires DO      Consulting Physician INFECTIOUS DISEASES     Duncan Finch MD      Consulting Physician HOSPITALIST            Pending Labs       Order Current Status    Blood Culture In process    Blood Culture In process            Discharge Plan:   Discharge Condition: Stable    Current Discharge Medication List        New Orders    Details   clindamycin 300 MG Oral Cap Take 1 capsule (300 mg total) by mouth 3 (three) times daily for 10 days.           Home Meds - Unchanged    Details   semaglutide 2 MG/3ML Subcutaneous Solution Pen-injector Inject 0.25 mg into the skin Every Saturday.      furosemide 40 MG Oral Tab Take 1 tablet (40 mg total) by mouth daily.      Potassium Chloride ER 20 MEQ Oral Tab CR Take 20 mEq by mouth daily.      Multiple Vitamins-Minerals (MULTI-VITAMIN/MINERALS) Oral Tab Take 1 tablet by mouth daily.      Ascorbic Acid (VITAMIN C) 1000 MG Oral Tab Take 1 tablet (1,000 mg total) by mouth daily.      latanoprost 0.005 % Ophthalmic Solution Place 1 drop into the left eye nightly.      dorzolamide-timolol 2-0.5 % Ophthalmic Solution Place 1 drop into the left eye 2 (two) times daily.      brimonidine 0.15 % Ophthalmic Solution Place 1 drop into the left eye in the morning and 1 drop before bedtime.      empagliflozin 10 MG Oral Tab Take 1 tablet (10 mg total) by mouth daily.      Irbesartan 150 MG Oral Tab Take 1 tablet (150 mg total) by mouth daily.      hydrochlorothiazide 12.5 MG Oral Tab Take 1 tablet (12.5 mg total) by mouth daily.      AmLODIPine Besylate 10 MG Oral  Tab Take 1 tablet (10 mg total) by mouth daily.      MetFORMIN HCl 1000 MG Oral Tab Take 1 tablet (1,000 mg total) by mouth daily with breakfast.      Liraglutide (VICTOZA) 18 MG/3ML Subcutaneous Solution Pen-injector Inject into the skin.                 Discharge Diet: Diabetic diet    Discharge Activity: As tolerated    Follow up:      Follow-up Information       Dinesh Gaxiola Follow up in 2 day(s).    Specialty: Family Medicine  Why: Please see your primary care team for hospital discharge follow up for your cellulitis and antibiotic course.  Contact information:  [address] [address] [phone]                             >30 minutes spent on discharge orders, coordination, and planning.         Earlene Dia MD  7/27/2024

## 2024-07-27 NOTE — PLAN OF CARE
Problem: Patient Centered Care  Goal: Patient preferences are identified and integrated in the patient's plan of care  Description: Interventions:  - What would you like us to know as we care for you? Patient has history of cellulitis   - Provide timely, complete, and accurate information to patient/family  - Incorporate patient and family knowledge, values, beliefs, and cultural backgrounds into the planning and delivery of care  - Encourage patient/family to participate in care and decision-making at the level they choose  - Honor patient and family perspectives and choices  Outcome: Progressing     Problem: Diabetes/Glucose Control  Goal: Glucose maintained within prescribed range  Description: INTERVENTIONS:  - Monitor Blood Glucose as ordered  - Assess for signs and symptoms of hyperglycemia and hypoglycemia  - Administer ordered medications to maintain glucose within target range  - Assess barriers to adequate nutritional intake and initiate nutrition consult as needed  - Instruct patient on self management of diabetes  Outcome: Progressing     Problem: Patient/Family Goals  Goal: Patient/Family Long Term Goal  Description: Patient's Long Term Goal: to go home     Interventions:  - See additional Care Plan goals for specific interventions  Outcome: Progressing  Goal: Patient/Family Short Term Goal  Description: Patient's Short Term Goal: To feel better     Interventions:   - See additional Care Plan goals for specific interventions  Outcome: Progressing     Problem: PAIN - ADULT  Goal: Verbalizes/displays adequate comfort level or patient's stated pain goal  Description: INTERVENTIONS:  - Encourage pt to monitor pain and request assistance  - Assess pain using appropriate pain scale  - Administer analgesics based on type and severity of pain and evaluate response  - Implement non-pharmacological measures as appropriate and evaluate response  - Consider cultural and social influences on pain and pain  management  - Manage/alleviate anxiety  - Utilize distraction and/or relaxation techniques  - Monitor for opioid side effects  - Notify MD/LIP if interventions unsuccessful or patient reports new pain  - Anticipate increased pain with activity and pre-medicate as appropriate  Outcome: Progressing     Problem: RISK FOR INFECTION - ADULT  Goal: Absence of fever/infection during anticipated neutropenic period  Description: INTERVENTIONS  - Monitor WBC  - Administer growth factors as ordered  - Implement neutropenic guidelines  Outcome: Progressing   Patient noticed redness behind right knee today. Patient had fever at home. Patient came to ED and was admitted for cellulitis. IV antibiotics, IVF infusing. VSS. No complaints of pain. Will continue to monitor patient.

## 2024-07-27 NOTE — ED QUICK NOTES
Patient presents to ED 45 from triage with c/o right leg pain and fever. Patient has a hx of cellulitis, and states today he noticed that the back of his leg, near the knee started causing pain and developed some redness. Patient called his MD who prescribed abx which he states he took 1 dose of. Patient is A&O x 4. No distress noted. Respirations regular and unlabored. Call light at reach.

## 2024-07-27 NOTE — ED QUICK NOTES
Orders for admission, patient is aware of plan and ready to go upstairs. Any questions, please call ED RN Quin at extension [phone].     Patient Covid vaccination status: Fully vaccinated     COVID Test Ordered in ED: None    COVID Suspicion at Admission: N/A    Running Infusions:    sodium chloride 125 mL/hr (07/26/24 4394)        Mental Status/LOC at time of transport: A&Ox4    Other pertinent information: Continent, ambulatory    CIWA score: N/A   NIH score:  N/A

## 2024-07-29 ENCOUNTER — PATIENT OUTREACH (OUTPATIENT)
Dept: CASE MANAGEMENT | Age: 44
End: 2024-07-29

## 2024-07-29 NOTE — PAYOR COMM NOTE
--------------  ADMISSION REVIEW     Payor: [employer]  Subscriber #:  [id number]  Authorization Number: [id number]    Admit date: 7/26/24  Admit time:  8:13 PM       REVIEW DOCUMENTATION:    ED Provider Notes signed by Allie Gaitan MD at 7/26/2024 10:28 PM       Author: Allie Gaitan MD Service: -- Author Type: Physician    Filed: 7/26/2024 10:28 PM Date of Service: 7/26/2024  5:59 PM Status: Signed    : Allie Gaitan MD (Physician)           Patient Seen in: [facility] Emergency Department      History     Chief Complaint   Patient presents with    Fever    Leg Pain     Stated Complaint: rt leg pain, Hx cellulitis    Subjective:   HPI  43-year-old male with hypertension, diabetes, obesity, history of recurrent lower extremity cellulitis, also with bilateral lower extremity lymphedema, presents for evaluation of fever, right leg pain, warmth and swelling.  Symptoms started earlier today.  He had a fever of 101 Fahrenheit at home and took ibuprofen at 3:45 PM.  He also notes that his heart was racing.  He called his PCP who started him on clindamycin and he took 1 dose today.  He came in for persistent elevated heart rate as he has had sepsis in the past.      Objective:   Past Medical History:    Diabetes (HCC)    Essential hypertension    Glaucoma    High blood pressure          Past Surgical History:   Procedure Laterality Date    Eye surgery      Fracture surgery           Review of Systems    Positive for stated Chief Complaint: Fever and Leg Pain    Other systems are as noted in HPI.  Constitutional and vital signs reviewed.      All other systems reviewed and negative except as noted above.    Physical Exam     ED Triage Vitals   BP 07/26/24 1739 121/80   Pulse 07/26/24 1736 120   Resp 07/26/24 1736 18   Temp 07/26/24 1736 99.7 °F (37.6 °C)   Temp src 07/26/24 1736 Oral   SpO2 07/26/24 1736 95 %   O2 Device 07/26/24 1736 None (Room air)       Current Vitals:   Vital Signs  BP:  159/85  Pulse: 111  Resp: 20  Temp: 98.6 °F (37 °C)  Temp src: Oral  MAP (mmHg): (!) 106    Oxygen Therapy  SpO2: 95 %  O2 Device: None (Room air)            Physical Exam  Vitals and nursing note reviewed.   Constitutional:       Appearance: He is well-developed.   HENT:      Head: Normocephalic and atraumatic.   Eyes:      Extraocular Movements: Extraocular movements intact.   Cardiovascular:      Rate and Rhythm: Regular rhythm. Tachycardia present.      Heart sounds: Normal heart sounds.   Pulmonary:      Effort: Pulmonary effort is normal.      Breath sounds: Normal breath sounds.   Abdominal:      General: There is no distension.      Palpations: Abdomen is soft.      Tenderness: There is no abdominal tenderness.   Musculoskeletal:         General: Normal range of motion.      Cervical back: Normal range of motion.   Skin:     General: Skin is warm.      Capillary Refill: Capillary refill takes less than 2 seconds.      Comments: At the right posterior distal thigh there is approximately 7 x 5 cm area of erythema and warmth that does not extend to the popliteal fossa   Neurological:      Mental Status: He is alert.      Comments: No focal deficits       Differential diagnosis includes but is not limited to cellulitis, sepsis, bacteremia, less likely DVT        ED Course     Labs Reviewed   COMP METABOLIC PANEL (14) - Abnormal; Notable for the following components:       Result Value    Glucose 175 (*)     Sodium 135 (*)     Potassium 3.4 (*)     All other components within normal limits   POCT GLUCOSE - Abnormal; Notable for the following components:    POC Glucose  213 (*)     All other components within normal limits   CBC W/ DIFFERENTIAL - Abnormal; Notable for the following components:    WBC 26.0 (*)     Neutrophil Absolute Prelim 23.91 (*)     Neutrophil Absolute 23.91 (*)     Lymphocyte Absolute 0.92 (*)            Medical Decision Making    Patient well-appearing and ambulatory in the ED.  CMP with  hyperglycemia without gap or acidosis.  CBC with significant leukocytosis and left shift.  Lactic acid normal.  Blood cultures pending.  He was treated with fluids and clindamycin as this has improved his cellulitis in the past.  Discussed with Dr. Baires for ID consultation and Dr. Finch for admission to the hospital.      Problems Addressed:  Cellulitis of right lower extremity: complicated acute illness or injury with systemic symptoms    Amount and/or Complexity of Data Reviewed  Labs: ordered. Decision-making details documented in ED Course.  Discussion of management or test interpretation with external provider(s): As above    Risk  Decision regarding hospitalization.      Disposition and Plan     Clinical Impression:  1. Cellulitis of right lower extremity         Disposition:  Admit  7/26/2024  7:30 pm      Hospital Problems       Present on Admission  Date Reviewed: 9/24/2022            ICD-10-CM Noted POA    * (Principal) Cellulitis of right lower extremity L03.115 7/26/2024 Unknown    Cellulitis L03.90 7/26/2024 Unknown             7/26 H&P    HISTORY AND PHYSICAL EXAMINATION     CHIEF COMPLAINT:  Right lower extremity cellulitis.     HISTORY OF PRESENT ILLNESS:  Patient is a 43-year-old  male with morbid obesity, diabetes mellitus type 2, and chronic stasis dermatitis with recurrent cellulitis, started noticing increased erythema and inflammation in his right distal leg and right medial thigh yesterday.  He was given a prescription for clindamycin which he took the first dose this morning.  He went to work and started having diaphoresis and feeling aches and pains all over his body, came into the emergency department for evaluation.  Temperature was 99.6, pulse was 125, tachycardia.  Lactic acid was negative.  CBC showed white blood cell count of 26.0 with left shift.  Chemistry:  Potassium 3.4, glucose 175.  Blood cultures were obtained.  Started on IV clindamycin.  He will be admitted to  the hospital for further management.     PAST MEDICAL HISTORY:  Morbid obesity, diabetes mellitus type 2, chronic stasis dermatitis both lower extremities.  He had history of stasis ulcer with pseudomonas positive culture while he was having an ulcer.  He usually gets recurrent cellulitis in his legs and he says clindamycin usually works for him.      PAST SURGICAL HISTORY:  He had retinal detachment procedure and right ankle open reduction and internal fixation.       REVIEW OF SYSTEMS:  Patient reports yesterday started developing increased erythema and inflammation in his distal right leg and some streaking in the medial aspect of his right thigh.  He was given a prescription for clindamycin as outlined above.  Today, he felt body aches and subjective fevers and decided to come in to the emergency department for evaluation.       PHYSICAL EXAMINATION:    GENERAL:  Alert and oriented to time, place, and person.  No acute distress.  VITAL SIGNS:  Temperature 99.7, pulse 105, respiratory rate 18, blood pressure 110/72, pulse ox 94% on room air.  HEENT:  Atraumatic.  Oropharynx clear.  Moist mucous membranes.  Ears and nose are normal.  Eyes:  Anicteric sclerae.  NECK:  Supple.  No lymphadenopathy.  Trachea midline.  Full range of motion.  LUNGS:  Clear to auscultation bilaterally.  Normal respiratory effort.  HEART:  Regular rate and rhythm.  S1, S2 auscultated.  No murmur.  ABDOMEN:  Soft, nondistended.  Obese.  Positive bowel sounds.  EXTREMITIES:  Stasis dermatitis both lower extremities.  Right distal leg erythema and mild streaking to the medial and posterior right thigh pannus.   NEUROLOGIC:  Motor and sensory intact.      ASSESSMENT AND PLAN:    1.       Right lower extremity cellulitis.  2.       Leukocytosis, rule out sepsis.  3.       Morbid obesity.  4.       Diabetes mellitus type 2.     Patient will be admitted to general medical floor.  Blood cultures.  IV clindamycin.  Infectious Disease consult.   Monitor Accu-Cheks.  Monitor hemodynamic status.  Further recommendations to follow.       7/27 ID consult      Reason for Consultation:  RLE cellulitis     ASSESSMENT:     Antibiotics: Clindamycin     # Acute RLE cellulitis with leukocytosis, R/o bacteremia  # Leukocytosis   # Diabetes mellitus  # Morbid obesity     PLAN:  -  Continue on clindamycin.  -  FU cx.  -  Leg elevation.  -  Follow fever curve, wbc.  -  Reviewed labs, micro, imaging reports, available old records.  -  Case d/w patient, RN.     History of Present Illness:  Rajendra Coronado . is a 43 year old male with a history of morbid obesity, diabetes mellitus, HTN, with history of cellulitis in RLE with last hospitalization 9/2022 at OSH who presented to [facility] ED on 7/26 with fever. Noticed that his right leg was getting more swollen yesterday morning and took a dose of clindamycin but then had a fever at home so came to the ED. On arrival, Tmax 99.7, wbc 26, blood cx obtained, started on clindamycin. States that clindamycin works best for him. Of note, per records from OSH admission 9/2022, patient was on ancef inpatient and discharged on dalvance. ID consulted.     History:  Past Medical History       Past Medical History:    Diabetes (HCC)    Essential hypertension    Glaucoma    High blood pressure         Past Surgical History         Past Surgical History:   Procedure Laterality Date    Eye surgery        Fracture surgery               Medications:    Current Hospital Medications      Current Facility-Administered Medications:     sodium chloride 0.9% infusion, 125 mL/hr, Intravenous, Continuous    clindamycin phosphate in dextrose 5% (Cleocin) 600 mg/50mL IVPB premix 600 mg, 600 mg, Intravenous, Q8H    glucose (Dex4) 15 GM/59ML oral liquid 15 g, 15 g, Oral, Q15 Min PRN **OR** glucose (Glutose) 40% oral gel 15 g, 15 g, Oral, Q15 Min PRN **OR** glucose-vitamin C (Dex-4) chewable tab 4 tablet, 4 tablet, Oral, Q15 Min PRN **OR** dextrose 50%  injection 50 mL, 50 mL, Intravenous, Q15 Min PRN **OR** glucose (Dex4) 15 GM/59ML oral liquid 30 g, 30 g, Oral, Q15 Min PRN **OR** glucose (Glutose) 40% oral gel 30 g, 30 g, Oral, Q15 Min PRN **OR** glucose-vitamin C (Dex-4) chewable tab 8 tablet, 8 tablet, Oral, Q15 Min PRN    insulin aspart (NovoLOG) 100 Units/mL FlexPen 1-11 Units, 1-11 Units, Subcutaneous, TID CC    sodium chloride 0.9% infusion, , Intravenous, Continuous    enoxaparin (Lovenox) 40 MG/0.4ML SUBQ injection 40 mg, 40 mg, Subcutaneous, Daily    acetaminophen (Tylenol Extra Strength) tab 500 mg, 500 mg, Oral, Q4H PRN    ondansetron (Zofran) 4 MG/2ML injection 4 mg, 4 mg, Intravenous, Q6H PRN    prochlorperazine (Compazine) 10 MG/2ML injection 5 mg, 5 mg, Intravenous, Q8H PRN    temazepam (Restoril) cap 15 mg, 15 mg, Oral, Nightly PRN    dorzolamide-timolol (Cosopt) 2-0.5 % ophthalmic solution 1 drop, 1 drop, Left Eye, BID    latanoprost (Xalatan) 0.005 % ophthalmic solution 1 drop, 1 drop, Left Eye, Nightly    brimonidine (Alphagan) 0.15 % ophthalmic solution 1 drop -pt's own medication, 1 drop, Left Eye, BID        Review of Systems:  CONSTITUTIONAL:  No weight loss, weakness or fatigue.  HEENT:  Eyes:  No visual loss, blurred vision, double vision or yellow sclerae. Ears, Nose, Throat:  No hearing loss, sneezing, congestion, runny nose or sore throat.  SKIN:  No rash or itching.  CARDIOVASCULAR:  No chest pain, chest pressure or chest discomfort  RESPIRATORY:  No shortness of breath, cough or sputum.  GASTROINTESTINAL:  No anorexia, nausea, vomiting or diarrhea. No abdominal pain or blood.  GENITOURINARY:  No Burning on urination.   NEUROLOGICAL:  No headache, dizziness, syncope, paralysis, ataxia, numbness or tingling in the extremities.  MUSCULOSKELETAL:  +RLE pain  HEMATOLOGIC:  No anemia, bleeding or bruising.  ALLERGIES:  No history of asthma, hives, eczema or rhinitis.     Physical Exam:  Vital signs: Blood pressure 115/77, pulse 88,  temperature 98.7 °F (37.1 °C), temperature source Oral, resp. rate 18, weight (!) 480 lb (217.7 kg), SpO2 94%.     General: Awake, in chair  HEENT: Moist mucous membranes.   Neck: No lymphadenopathy.  Supple.  Cardiovascular: RRR  Respiratory: Clear to auscultation bilaterally.  No wheezes. No rhonchi.  Abdomen: Soft, obese  Musculoskeletal: No edema noted  Integument: R medial thigh erythema, warmth  Lines: PIV+     Laboratory Data:      Recent Labs   Lab 07/26/24  1824   RBC 4.95   HGB 14.7   HCT 41.8   MCV 84.4   MCH 29.7   MCHC 35.2   RDW 13.0   NEPRELIM 23.91*   WBC 26.0*   .0          Recent Labs   Lab 07/26/24  1822   *   BUN 17   CREATSERUM 0.90   CA 9.5   ALB 4.5   *   K 3.4*      CO2 25.0   ALKPHO 74   AST 15   ALT 10   BILT 0.9   TP 7.8       Deborah Lawton PA-C   [facility]  [phone]  7/27/2024            Attestation signed by Cirilo Baires DO at 7/27/2024  1:24 PM     ID Attending:     Patient seen and examined.  Notes, labs, chart reviewed.  Case discussed with VIKTOR  Agree with exam, assessment and plan as discussed with VIKTOR and documented in VIKTOR note.  Plan as outlined  D/w patient  D/w staff  Will follow                         MEDICATIONS ADMINISTERED IN LAST 1 DAY:  Administration History                    clindamycin phosphate in dextrose 5% (Cleocin) 600 mg/50mL IVPB premix 600 mg  Dose: 600 mg  Freq: Every 8 hours Route: IV  Last Dose: 600 mg (07/27/24 1209)  Start: 07/27/24 0400 End: 07/27/24 1639   Order specific questions:         0400 MC-New Bag   1209 CH-New Bag   1639-D/C'd      clindamycin phosphate in dextrose 5% (Cleocin) 600 mg/50mL IVPB premix 600 mg  Dose: 600 mg  Freq: Once Route: IV  Last Dose: 600 mg (07/26/24 2003)  Start: 07/26/24 1943 End: 07/26/24 2033   Order specific questions:        2003 SC-New Bag   2005 MM-Handoff                             potassium chloride (Klor-Con M20) tab 20 mEq  Dose: 20 mEq  Freq:  Daily Route: OR  Start: 07/27/24 1045 End: 07/27/24 1639   Admin Instructions:   Do not crush      1209 CH-Given   1639-D/C'd       potassium chloride (Klor-Con M20) tab 40 mEq  Dose: 40 mEq  Freq: Once Route: OR  Start: 07/26/24 2215 End: 07/26/24 2224   Admin Instructions:   Do not crush     2224 MC-Given                        sodium chloride 0.9% infusion  Rate: 100 mL/hr  Freq: Continuous Route: IV  Start: 07/26/24 2030 End: 07/27/24 1039 2030 MC-New Bag        1039-D/C'd            sodium chloride 0.9% infusion  Rate: 125 mL/hr Dose: 125 mL/hr  Freq: Continuous Route: IV  Last Dose: Stopped (07/27/24 1157)  Start: 07/26/24 1758 End: 07/27/24 1156 1834 SC-New Bag   1927 GD-Handoff       1156-D/C'd  1157 CH-Stopped                  Vitals (last day) before discharge       Date/Time Temp Pulse Resp BP SpO2 Weight O2 Device O2 Flow Rate (L/min) [facility]    07/27/24 1335 98.7 °F (37.1 °C) -- -- -- -- -- -- --     07/27/24 1202 -- 80 18 136/88 97 % -- None (Room air) --     07/27/24 0937 98.6 °F (37 °C) 86 18 125/75 93 % -- None (Room air) --     07/27/24 0542 98.7 °F (37.1 °C) 88 18 115/77 94 % -- None (Room air) --     07/27/24 0012 -- 97 -- -- -- -- -- -- MM    07/26/24 2020 98.6 °F (37 °C) 111 20 159/85 95 % -- None (Room air) --     07/26/24 1930 -- 105 -- 110/72 94 % -- None (Room air) -- GD    07/26/24 1906 -- 105 -- 130/74 93 % -- -- -- SC    07/26/24 1836 -- 106 -- 119/87 94 % -- -- -- SC    07/26/24 1806 -- 107 -- 114/71 93 % -- -- -- SC    07/26/24 1745 -- -- -- -- -- -- None (Room air) -- SC    07/26/24 1739 -- -- -- 121/80 -- -- -- --     07/26/24 1736 99.7 °F (37.6 °C) 120 18 -- 95 % 480 lb (217.7 kg) None (Room air) --                   Latest Reference Range & Units 07/26/24 18:22 07/27/24 10:56   Glucose 70 - 99 mg/dL 175 (H) 203 (H)   HEMOGLOBIN A1c <5.7 %  9.7 (H)   ESTIMATED AVERAGE GLUCOSE 68 - 126 mg/dL  232 (H)   Sodium 136 - 145 mmol/L 135 (L) 136   Potassium 3.5 - 5.1  mmol/L  3.5 - 5.1 mmol/L 3.4 (L) 3.7  3.7   Chloride 98 - 112 mmol/L 101 103   Carbon Dioxide, Total 21.0 - 32.0 mmol/L 25.0 27.0      Latest Reference Range & Units 07/26/24 18:24 07/27/24 10:56   WBC 4.0 - 11.0 x10(3) uL 26.0 (H) 11.5 (H)   Hemoglobin 13.0 - 17.5 g/dL 14.7 14.1   Hematocrit 39.0 - 53.0 % 41.8 40.5   Platelet Count 150.0 - 450.0 10(3)uL 250.0 211.0   RBC 4.30 - 5.70 x10(6)uL 4.95 4.76   MCH 26.0 - 34.0 pg 29.7 29.6   MCHC 31.0 - 37.0 g/dL 35.2 34.8   MCV 80.0 - 100.0 fL 84.4 85.1   RDW 11.0 - 15.0 % 13.0 13.0   RDW-SD 35.1 - 46.3 fL 39.9 40.6   Prelim Neutrophil Abs 1.50 - 7.70 x10 (3) uL 23.91 (H) 9.22 (H)   Neutrophils Absolute 1.50 - 7.70 x10(3) uL 23.91 (H) 9.22 (H)   Lymphocytes Absolute 1.00 - 4.00 x10(3) uL 0.92 (L) 1.35

## 2024-07-29 NOTE — PAYOR COMM NOTE
--------------  DISCHARGE REVIEW    Payor: PATRICA GASTELUM POS/MCNP  Subscriber #:  [id number]  Authorization Number: [id number]    Admit date: 24  Admit time:   8:13 PM  Discharge Date: 2024  2:39 PM     Admitting Physician: Duncan Finch MD  Attending Physician:  No att. providers found  Primary Care Physician: Dinesh Gaxiola          Discharge Summary Notes        Discharge Summary signed by Earlene Dia MD at 2024 10:14 PM       Author: Earlene Dia MD Specialty: HOSPITALIST, Family Medicine Author Type: Physician    Filed: 2024 10:14 PM Date of Service: 2024  1:03 PM Status: Signed    : Earlene Dia MD (Physician)         [facility]  part of [facility]    Discharge Summary    Rajendra Yepez Ivonne Abhi Patient Status:  Inpatient    1980 MRN [id number]   Location [facility] 5SW/SE Attending Earlene Dia MD   Hosp Day # 1 PCP DINESH GAXIOLA     Date of Admission: 2024   Date of Discharge: 2024    Admitting Diagnosis: Cellulitis of right lower extremity [L03.115]    Disposition: Home    Discharge Diagnosis: .Principal Problem:    Cellulitis of right lower extremity  Active Problems:    Cellulitis      Hospital Course:   Reason for Admission: Right lower extremity cellulitis     Discharge Physical Exam:     GENERAL:  Alert and oriented to time, place, and person.  No acute distress.  HEENT:  MMM  NECK:  Supple.  No lymphadenopathy.   LUNGS:  Clear to auscultation bilaterally.  Normal respiratory effort.  HEART:  Regular rate and rhythm.  S1, S2 auscultated.  No murmur.  ABDOMEN:  Soft, nondistended.  Obese.  Positive bowel sounds.  EXTREMITIES:  Stasis dermatitis both lower extremities.  Right distal leg erythema and mild streaking to the medial and posterior right thigh pannus.   NEUROLOGIC:  Motor and sensory intact.     Hospital Course:     Patient is a 43-year-old  male with morbid obesity, diabetes mellitus  type 2, and chronic stasis dermatitis with recurrent cellulitis, started noticing increased erythema and inflammation in his right distal leg and right medial thigh yesterday.  He was given a prescription for clindamycin which he took the first dose this morning.  He went to work and started having diaphoresis and feeling aches and pains all over his body, came into the emergency department for evaluation.  Temperature was 99.6, pulse was 125, tachycardia.  Lactic acid was negative.  CBC showed white blood cell count of 26.0 with left shift.  Chemistry:  Potassium 3.4, glucose 175.  Blood cultures were obtained.  Started on IV clindamycin.  He was admitted to the hospital for further management. Brief hospital course unremarkable, responded well to IV Clindamycin, with improvement in vitals and labs. ID was consulted and recommendations left. Patient requested to be discharged. ID was in agreement. Patient discharged with Rx for clindamycin 300mg TID x 10 days. Counseled on strict PCP follow up.     Complications: None    Consultants         Provider   Role Specialty     Cirilo Baires DO      Consulting Physician INFECTIOUS DISEASES     Duncan Finch MD      Consulting Physician HOSPITALIST            Pending Labs       Order Current Status    Blood Culture In process    Blood Culture In process            Discharge Plan:   Discharge Condition: Stable    Current Discharge Medication List        New Orders    Details   clindamycin 300 MG Oral Cap Take 1 capsule (300 mg total) by mouth 3 (three) times daily for 10 days.           Home Meds - Unchanged    Details   semaglutide 2 MG/3ML Subcutaneous Solution Pen-injector Inject 0.25 mg into the skin Every Saturday.      furosemide 40 MG Oral Tab Take 1 tablet (40 mg total) by mouth daily.      Potassium Chloride ER 20 MEQ Oral Tab CR Take 20 mEq by mouth daily.      Multiple Vitamins-Minerals (MULTI-VITAMIN/MINERALS) Oral Tab Take 1 tablet by mouth daily.       Ascorbic Acid (VITAMIN C) 1000 MG Oral Tab Take 1 tablet (1,000 mg total) by mouth daily.      latanoprost 0.005 % Ophthalmic Solution Place 1 drop into the left eye nightly.      dorzolamide-timolol 2-0.5 % Ophthalmic Solution Place 1 drop into the left eye 2 (two) times daily.      brimonidine 0.15 % Ophthalmic Solution Place 1 drop into the left eye in the morning and 1 drop before bedtime.      empagliflozin 10 MG Oral Tab Take 1 tablet (10 mg total) by mouth daily.      Irbesartan 150 MG Oral Tab Take 1 tablet (150 mg total) by mouth daily.      hydrochlorothiazide 12.5 MG Oral Tab Take 1 tablet (12.5 mg total) by mouth daily.      AmLODIPine Besylate 10 MG Oral Tab Take 1 tablet (10 mg total) by mouth daily.      MetFORMIN HCl 1000 MG Oral Tab Take 1 tablet (1,000 mg total) by mouth daily with breakfast.      Liraglutide (VICTOZA) 18 MG/3ML Subcutaneous Solution Pen-injector Inject into the skin.

## 2024-07-29 NOTE — PROGRESS NOTES
1st attempt [facility] apt request  DM/PCP -decline, pt stated he can make own apt  INF DIS -decline, pt stated he will f/up w/ PCP  Closing encounter

## 2024-10-21 ENCOUNTER — HOSPITAL ENCOUNTER (EMERGENCY)
Facility: HOSPITAL | Age: 44
Discharge: HOME | End: 2024-10-21
Attending: EMERGENCY MEDICINE
Payer: COMMERCIAL

## 2024-10-21 ENCOUNTER — APPOINTMENT (OUTPATIENT)
Dept: CT IMAGING | Facility: HOSPITAL | Age: 44
End: 2024-10-21
Attending: EMERGENCY MEDICINE
Payer: COMMERCIAL

## 2024-10-21 VITALS
WEIGHT: 315 LBS | SYSTOLIC BLOOD PRESSURE: 141 MMHG | RESPIRATION RATE: 18 BRPM | DIASTOLIC BLOOD PRESSURE: 97 MMHG | OXYGEN SATURATION: 96 % | TEMPERATURE: 99 F | HEART RATE: 102 BPM | BODY MASS INDEX: 66 KG/M2

## 2024-10-21 DIAGNOSIS — N39.0 URINARY TRACT INFECTION WITH HEMATURIA, SITE UNSPECIFIED: Primary | ICD-10-CM

## 2024-10-21 DIAGNOSIS — R31.9 URINARY TRACT INFECTION WITH HEMATURIA, SITE UNSPECIFIED: Primary | ICD-10-CM

## 2024-10-21 DIAGNOSIS — R73.9 HYPERGLYCEMIA: ICD-10-CM

## 2024-10-21 LAB
ANION GAP SERPL CALC-SCNC: 6 MMOL/L (ref 0–18)
BASOPHILS # BLD AUTO: 0.05 X10(3) UL (ref 0–0.2)
BASOPHILS NFR BLD AUTO: 0.4 %
BILIRUB UR QL CFM: NEGATIVE
BUN BLD-MCNC: 17 MG/DL (ref 9–23)
BUN/CREAT SERPL: 17.5 (ref 10–20)
CALCIUM BLD-MCNC: 9.9 MG/DL (ref 8.7–10.4)
CHLORIDE SERPL-SCNC: 98 MMOL/L (ref 98–112)
CO2 SERPL-SCNC: 30 MMOL/L (ref 21–32)
CREAT BLD-MCNC: 0.97 MG/DL
DEPRECATED RDW RBC AUTO: 39.8 FL (ref 35.1–46.3)
EGFRCR SERPLBLD CKD-EPI 2021: 99 ML/MIN/1.73M2 (ref 60–?)
EOSINOPHIL # BLD AUTO: 0.26 X10(3) UL (ref 0–0.7)
EOSINOPHIL NFR BLD AUTO: 2.3 %
ERYTHROCYTE [DISTWIDTH] IN BLOOD BY AUTOMATED COUNT: 12.5 % (ref 11–15)
GLUCOSE BLD-MCNC: 406 MG/DL (ref 70–99)
GLUCOSE BLDC GLUCOMTR-MCNC: 381 MG/DL (ref 70–99)
HCT VFR BLD AUTO: 45.5 %
HGB BLD-MCNC: 15.6 G/DL
IMM GRANULOCYTES # BLD AUTO: 0.04 X10(3) UL (ref 0–1)
IMM GRANULOCYTES NFR BLD: 0.4 %
LYMPHOCYTES # BLD AUTO: 2.22 X10(3) UL (ref 1–4)
LYMPHOCYTES NFR BLD AUTO: 19.5 %
MCH RBC QN AUTO: 30.1 PG (ref 26–34)
MCHC RBC AUTO-ENTMCNC: 34.3 G/DL (ref 31–37)
MCV RBC AUTO: 87.7 FL
MONOCYTES # BLD AUTO: 0.63 X10(3) UL (ref 0.1–1)
MONOCYTES NFR BLD AUTO: 5.5 %
NEUTROPHILS # BLD AUTO: 8.2 X10 (3) UL (ref 1.5–7.7)
NEUTROPHILS # BLD AUTO: 8.2 X10(3) UL (ref 1.5–7.7)
NEUTROPHILS NFR BLD AUTO: 71.9 %
OSMOLALITY SERPL CALC.SUM OF ELEC: 297 MOSM/KG (ref 275–295)
PLATELET # BLD AUTO: 301 10(3)UL (ref 150–450)
POTASSIUM SERPL-SCNC: 4.1 MMOL/L (ref 3.5–5.1)
RBC # BLD AUTO: 5.19 X10(6)UL
RBC #/AREA URNS AUTO: >10 /HPF
SODIUM SERPL-SCNC: 134 MMOL/L (ref 136–145)
SP GR UR STRIP: 1.01 (ref 1–1.03)
WBC # BLD AUTO: 11.4 X10(3) UL (ref 4–11)
WBC #/AREA URNS AUTO: >50 /HPF

## 2024-10-21 PROCEDURE — 87086 URINE CULTURE/COLONY COUNT: CPT | Performed by: EMERGENCY MEDICINE

## 2024-10-21 PROCEDURE — 99284 EMERGENCY DEPT VISIT MOD MDM: CPT

## 2024-10-21 PROCEDURE — 81015 MICROSCOPIC EXAM OF URINE: CPT | Performed by: EMERGENCY MEDICINE

## 2024-10-21 PROCEDURE — 85025 COMPLETE CBC W/AUTO DIFF WBC: CPT | Performed by: EMERGENCY MEDICINE

## 2024-10-21 PROCEDURE — 81001 URINALYSIS AUTO W/SCOPE: CPT | Performed by: EMERGENCY MEDICINE

## 2024-10-21 PROCEDURE — 74176 CT ABD & PELVIS W/O CONTRAST: CPT | Performed by: EMERGENCY MEDICINE

## 2024-10-21 PROCEDURE — 80048 BASIC METABOLIC PNL TOTAL CA: CPT | Performed by: EMERGENCY MEDICINE

## 2024-10-21 PROCEDURE — 82962 GLUCOSE BLOOD TEST: CPT

## 2024-10-21 PROCEDURE — 36415 COLL VENOUS BLD VENIPUNCTURE: CPT

## 2024-10-21 RX ORDER — INSULIN ASPART 100 [IU]/ML
0.2 INJECTION, SOLUTION INTRAVENOUS; SUBCUTANEOUS ONCE
Status: COMPLETED | OUTPATIENT
Start: 2024-10-21 | End: 2024-10-21

## 2024-10-21 NOTE — ED PROVIDER NOTES
Patient Seen in: [facility] Emergency Department      History     Chief Complaint   Patient presents with    Bleeding     Stated Complaint: Hematuria    Subjective:   HPI      Patient is a 43-year-old male who presents with hematuria with blood clots x 1 day.  He had similar symptoms 2 weeks ago and was told by his doctor he had a UTI.  He was started on Cipro but then his culture came back resistant to Cipro so he was switched to Macrobid.  He completed 10 days of Macrobid 2 days ago.  He also was taken off his Jardiance with the thought that this may be contributing to the UTI.  He was switched to Ozempic.  Positive urinary urgency and frequency.  Denies any fevers, abdominal pain or back pain.  No vomiting.  No anticoagulation.    Objective:     Past Medical History:    Diabetes (HCC)    Essential hypertension    Glaucoma    High blood pressure              Past Surgical History:   Procedure Laterality Date    Eye surgery      Fracture surgery                  Social History     Socioeconomic History    Marital status: Single   Tobacco Use    Smoking status: Never    Smokeless tobacco: Never     Social Drivers of Health     Food Insecurity: No Food Insecurity (7/26/2024)    Food Insecurity     Food Insecurity: Never true   Transportation Needs: No Transportation Needs (7/26/2024)    Transportation Needs     Lack of Transportation: No   Housing Stability: Low Risk  (7/26/2024)    Housing Stability     Housing Instability: No                  Physical Exam     ED Triage Vitals [10/21/24 0322]   BP (!) 141/97   Pulse 102   Resp 18   Temp 98.5 °F (36.9 °C)   Temp src Oral   SpO2 96 %   O2 Device None (Room air)       Current Vitals:   Vital Signs  BP: (!) 141/97  Pulse: 102  Resp: 18  Temp: 98.5 °F (36.9 °C)  Temp src: Oral  MAP (mmHg): (!) 112    Oxygen Therapy  SpO2: 96 %  O2 Device: None (Room air)        Physical Exam  GENERAL: No acute distress, awake and alert  HEENT: EOMI, PERRL  Neck: supple  CV:  RRR, no murmurs  Resp: CTAB, no wheezes or retractions  Ab: soft, nontender, no distension, no cvat  Extremities: FROM of all extremities  Neuro: CN intact, normal speech, normal gait, 5/5 motor strength in all extremities, no focal deficits  SKIN: warm, dry, no rashes      ED Course     Labs Reviewed   URINALYSIS WITH CULTURE REFLEX - Abnormal; Notable for the following components:       Result Value    Urine Color Red (*)     Clarity Urine Turbid (*)     All other components within normal limits   BASIC METABOLIC PANEL (8) - Abnormal; Notable for the following components:    Glucose 406 (*)     Sodium 134 (*)     Calculated Osmolality 297 (*)     All other components within normal limits   CBC WITH DIFFERENTIAL WITH PLATELET - Abnormal; Notable for the following components:    WBC 11.4 (*)     Neutrophil Absolute Prelim 8.20 (*)     Neutrophil Absolute 8.20 (*)     All other components within normal limits   UA MICROSCOPIC ONLY, URINE - Abnormal; Notable for the following components:    WBC Urine >50 (*)     RBC Urine >10 (*)     All other components within normal limits   POCT GLUCOSE - Abnormal; Notable for the following components:    POC Glucose  381 (*)     All other components within normal limits   ICTOTEST - Normal   URINE CULTURE, ROUTINE        MDM      Medical Decision Making  Ddx: cystitis, kidney stone, bladder tumor  Urine culture pending  Pt has refill rx for macrobid and will restart it pending culture results  Denies any pain and appears nontoxic  Advise urology f/u for recurrent infections    Also with elevated glucose.  Asymptomatic and with recent change to his oral medication but given dose of insulin prior to discharge.    Amount and/or Complexity of Data Reviewed  Labs: ordered.  Radiology: ordered.     Details: CT ABDOMEN AND PELVIS WITHOUT CONTRAST    COMPARISON: None.    IMPRESSION:  Pronounced central ring artifact limits assessment.    No urolithiasis or hydronephrosis seen.    No other  acute abnormality in the abdomen or pelvis.  Normal appendix.      Report finalized on 10/21/24 at 5:36 AM ET.      Dr. Francine West MD          Disposition and Plan     Clinical Impression:  1. Urinary tract infection with hematuria, site unspecified    2. Hyperglycemia         Disposition:  Discharge  10/21/2024  5:09 am    Follow-up:  Bartolo Cooney MD  [address] [place] [id number]  [phone]    Schedule an appointment as soon as possible for a visit            Medications Prescribed:  Current Discharge Medication List              Supplementary Documentation:

## 2024-10-21 NOTE — ED INITIAL ASSESSMENT (HPI)
Blood in urine today and 2 weeks prior. Given antibiotics with relief and started having blood in urine today again.

## 2024-11-21 ENCOUNTER — OFFICE VISIT (OUTPATIENT)
Dept: SURGERY | Facility: CLINIC | Age: 44
End: 2024-11-21
Payer: COMMERCIAL

## 2024-11-21 DIAGNOSIS — N39.0 RECURRENT UTI: Primary | ICD-10-CM

## 2024-11-21 PROCEDURE — 99204 OFFICE O/P NEW MOD 45 MIN: CPT | Performed by: UROLOGY

## 2024-11-21 RX ORDER — NITROFURANTOIN 25; 75 MG/1; MG/1
100 CAPSULE ORAL 2 TIMES DAILY
Qty: 22 CAPSULE | Refills: 0 | Status: SHIPPED | OUTPATIENT
Start: 2024-11-21 | End: 2024-12-02

## 2024-11-21 NOTE — PROGRESS NOTES
SUBJECTIVE:  Rajendra Coronado Jr. is a 43 year old male with morbid obesity, diabetes, hypertension accompanied by his girlfriend in follow-up to recurrent episode of symptomatic urinary tract infections manifest by frequency, urgency episodes of gross now resolved hematuria.  7 days ago he was restarted on Macrobid based on previous culture results and states his symptoms have since resolved.  He has had 4 episodes of UTI since early September.  Urine culture November 16, 2024 demonstrated greater than 100,000 colonies of E. coli resistant to multiple oral antibiotics but sensitive to nitrofurantoin.  No fevers chills nausea or vomiting.  Had a previous episode of urinary tract infection in the past.  Has daytime frequency and urgency at baseline but he attributes this to usually being on furosemide, hydrochlorothiazide and Jardiance which she has been advised to stop when he was diagnosed with a UTI a month and a half ago.  IPSS score is 5 quality-of-life index score is 5 who presents for a consultation at the request of, and a copy of this note will be sent to, Dinesh Delgado, for evaluation of  recurrent urinary tract infections. He states that the problem is unchanged. Symptoms include see above. He denies any other complaints.  CT abdomen pelvis stone protocol October 21, 2024:  PROCEDURE:   CT ABDOMEN + PELVIS KIDNEYSTONE 2D RNDR (NO IV NO ORAL) (CPT=74176)     COMPARISON: None.     INDICATIONS: Hematuria     TECHNIQUE:   CT images of the abdomen and pelvis were obtained without intravenous contrast material.  Automated exposure control for dose reduction was used. Adjustment of the mA and/or kV was done based on the patient's size. Use of iterative  reconstruction technique for dose reduction was used. Dose information is transmitted to the ACR (American College of Radiology) NRDR (National Radiology Data Registry) which includes the Dose Index Registry.  Examination limited by patient body  habitus.        FINDINGS:  LUNG BASES: No focal consolidation.  LIVER: Mild steatosis.  SPLEEN: No enlargement or focal lesion.    GALLBLADDER: No cholelithiasis.  PANCREAS: No lesion, fluid collection, ductal dilatation, or atrophy.    ADRENALS: No defined mass or abnormal enlargement.    KIDNEYS: No hydronephrosis or calculi.  AORTA/VASCULAR:   Abdominal aorta is normal in caliber.  ABDOMINAL NODES: No mass or adenopathy.    BOWEL/MESENTERY: No dilated loops of bowel or definite abnormal bowel wall thickening.  There is colonic diverticulosis.  The appendix is unremarkable.  No pneumatosis or pneumoperitoneum.    ABDOMINAL WALL: Unremarkable.  URINARY BLADDER: No focal wall thickening or calculus.    PELVIC NODES: Mildly enlarged distal bilateral external iliac nodes.  PELVIC ORGANS: No visible mass.  Pelvic organs appropriate for patient age.    BONES:   Spondylosis lower thoracic and lumbar spine.  OTHER: Negative.                 Impression   CONCLUSION:     Limited evaluation as detailed above.     No evidence of nephrolithiasis or obstructive uropathy.     Nonspecific mildly prominent lateral pelvic lymph nodes.  Advise short-term follow-up CT abdomen pelvis in 3 months.     Lesser incidental findings as above.     A preliminary report was issued by the [facility] teleradiology service. There are no major discrepancies.           Dictated by (CST): Tera Ayers MD on 10/21/2024 at 8:59 AM      Finalized by (CST): Tera Ayers MD on 10/21/2024 at 9:04 AM             Allergies: Allergies[1]    History:  Past Medical History:    Diabetes (HCC)    Essential hypertension    Glaucoma    High blood pressure      Past Surgical History:   Procedure Laterality Date    Eye surgery      Fracture surgery        Family History   Problem Relation Age of Onset    Heart Disorder Father       Social History:   Social History     Socioeconomic History    Marital status: Single   Tobacco Use    Smoking status:  Never    Smokeless tobacco: Never     Social Drivers of Health     Food Insecurity: No Food Insecurity (7/26/2024)    Food Insecurity     Food Insecurity: Never true   Transportation Needs: No Transportation Needs (7/26/2024)    Transportation Needs     Lack of Transportation: No   Housing Stability: Low Risk  (7/26/2024)    Housing Stability     Housing Instability: No            REVIEW OF SYSTEMS:  RESPIRATORY:  Negative for chest tightness, wheezing, cough, shortness of breath,  sputum production,chest pain or pleurisy.  CARDIOVASCULAR:  Negative for pain or chest discomfort, dizziness or fainting, palpitations, leg swelling, nocturia, or claudication.  GASTROINTESTINAL:  Negative for nausea, vomiting, diarrhea, constipation, heartburn or indigestion, abdominal pains, bloody or tarry stools.  GENERAL: Denies:  weight gain, weight loss, fever, night sweats, bone pain, malaise, and fatigue. Positive for:  none.  All other review of systems reviewed and otherwise negative    OBJECTIVE:  There were no vitals taken for this visit.  He appears well, in no apparent distress.  Alert and oriented times three, pleasant and cooperative.  CARDIOVASCULAR:normal apical impulse  RESPIRATORY: no chest wall deformities or tenderness  ABDOMEN: abdomen is soft without significant tenderness, masses, organomegaly or guarding  Skin exam grossly normal  Intact neurologically grossly  ASSESSMENT/PLAN  Encounter Diagnosis   Name Primary?    Recurrent UTI Yes   Recommend:  - Will extend course of nitrofurantoin for 11 additional days to complete 21 days total.  - Probiotic pills daily.  - Return to clinic in 2 weeks.  Will obtain a follow-up urine culture and check bladder scan for postvoid residual at that time.    No orders of the defined types were placed in this encounter.      Meds This Visit:  Requested Prescriptions     Signed Prescriptions Disp Refills    nitrofurantoin monohydrate macro (MACROBID) 100 MG Oral Cap 22 capsule 0      Sig: Take 1 capsule (100 mg total) by mouth 2 (two) times daily for 11 days.       Imaging & Referrals:  None                        [1]   Allergies  Allergen Reactions    Shellfish Allergy ANAPHYLAXIS

## 2024-12-02 ENCOUNTER — OFFICE VISIT (OUTPATIENT)
Dept: SURGERY | Facility: CLINIC | Age: 44
End: 2024-12-02

## 2024-12-02 DIAGNOSIS — N39.0 RECURRENT UTI: Primary | ICD-10-CM

## 2024-12-02 PROCEDURE — 99213 OFFICE O/P EST LOW 20 MIN: CPT | Performed by: UROLOGY

## 2024-12-02 NOTE — PROGRESS NOTES
Rajendra Coronado . is a 43 year old male.    HPI:     Chief Complaint   Patient presents with    UTI     Pt has 3 more days left nitrofurantoin         43-year-old male with recurrent urinary tract infections seen for consultation November 21, 2024.  I gave the patient an additional 11 days of Macrobid at his last visit.  States he has 3 more days left.  No UTI symptoms at present.  No gross hematuria dysuria frequency urgency or flank pain.  Previous evaluation included a noncontrast CT of the abdomen and pelvis in October which did not show any urologic notable findings.      HISTORY:  Past Medical History:    Diabetes (HCC)    Essential hypertension    Glaucoma    High blood pressure      Past Surgical History:   Procedure Laterality Date    Eye surgery      Fracture surgery        Family History   Problem Relation Age of Onset    Heart Disorder Father       Social History:   Social History     Socioeconomic History    Marital status: Single   Tobacco Use    Smoking status: Never    Smokeless tobacco: Never     Social Drivers of Health     Food Insecurity: No Food Insecurity (7/26/2024)    Food Insecurity     Food Insecurity: Never true   Transportation Needs: No Transportation Needs (7/26/2024)    Transportation Needs     Lack of Transportation: No   Housing Stability: Low Risk  (7/26/2024)    Housing Stability     Housing Instability: No        Medications (Active prior to today's visit):  Current Outpatient Medications   Medication Sig Dispense Refill    nitrofurantoin monohydrate macro (MACROBID) 100 MG Oral Cap Take 1 capsule (100 mg total) by mouth 2 (two) times daily for 11 days. 22 capsule 0    semaglutide 2 MG/3ML Subcutaneous Solution Pen-injector Inject 0.25 mg into the skin Every Saturday.      furosemide 40 MG Oral Tab Take 1 tablet (40 mg total) by mouth daily.      Potassium Chloride ER 20 MEQ Oral Tab CR Take 20 mEq by mouth daily.      Multiple Vitamins-Minerals (MULTI-VITAMIN/MINERALS)  Oral Tab Take 1 tablet by mouth daily.      Ascorbic Acid (VITAMIN C) 1000 MG Oral Tab Take 1 tablet (1,000 mg total) by mouth daily.      latanoprost 0.005 % Ophthalmic Solution Place 1 drop into the left eye nightly.      dorzolamide-timolol 2-0.5 % Ophthalmic Solution Place 1 drop into the left eye 2 (two) times daily.      brimonidine 0.15 % Ophthalmic Solution Place 1 drop into the left eye in the morning and 1 drop before bedtime.      empagliflozin 10 MG Oral Tab Take 1 tablet (10 mg total) by mouth daily.      Irbesartan 150 MG Oral Tab Take 1 tablet (150 mg total) by mouth daily.      hydrochlorothiazide 12.5 MG Oral Tab Take 1 tablet (12.5 mg total) by mouth daily.      AmLODIPine Besylate 10 MG Oral Tab Take 1 tablet (10 mg total) by mouth daily.      MetFORMIN HCl 1000 MG Oral Tab Take 1 tablet (1,000 mg total) by mouth daily with breakfast.      Liraglutide (VICTOZA) 18 MG/3ML Subcutaneous Solution Pen-injector Inject into the skin.         Allergies:  Allergies[1]      ROS:       PHYSICAL EXAM:   Bladder scan for postvoid residual volume 96 mL     ASSESSMENT/PLAN:   Assessment   Encounter Diagnosis   Name Primary?    Recurrent UTI Yes       Recommend:  - he has discontinued Jardiance.  I told the patient he may consider alternative medications to control his diabetes as it can increase the risk of infections in the future.  - He will repeat his urine culture 2 to 3 days after finishing his antibiotics.  - Reviewed follow-up otherwise on a as needed basis.         Orders This Visit:  No orders of the defined types were placed in this encounter.      Meds This Visit:  Requested Prescriptions      No prescriptions requested or ordered in this encounter       Imaging & Referrals:  None     12/2/2024  Bartolo Cooney MD               [1]   Allergies  Allergen Reactions    Shellfish Allergy ANAPHYLAXIS

## 2024-12-07 ENCOUNTER — LAB ENCOUNTER (OUTPATIENT)
Dept: LAB | Facility: HOSPITAL | Age: 44
End: 2024-12-07
Attending: UROLOGY
Payer: COMMERCIAL

## 2024-12-07 DIAGNOSIS — N39.0 RECURRENT UTI: ICD-10-CM

## 2024-12-07 PROCEDURE — 87077 CULTURE AEROBIC IDENTIFY: CPT

## 2024-12-07 PROCEDURE — 87086 URINE CULTURE/COLONY COUNT: CPT

## 2024-12-09 ENCOUNTER — TELEPHONE (OUTPATIENT)
Dept: SURGERY | Facility: CLINIC | Age: 44
End: 2024-12-09

## 2024-12-09 NOTE — TELEPHONE ENCOUNTER
I called and spoke with patient introduced myself and where I was calling from. I asked patient to verify their name and . I told patient I was calling him regarding results and stating that as soon as Dr. Cooney reviews the results we will let him know what the outcome is. Patient understood and call ended.

## 2024-12-10 NOTE — TELEPHONE ENCOUNTER
I s/w SL about pt's urine C&S results and she gave V/O to send Cefadroxil 500 mg twice daily for 10 days. LUISB happened to be walking past and SL asked for his advice and he stated that the bacteria is basically a contaminant one and asked if pt was having symptoms I told LUCIEN that I did not know and he stated if pt is having symptoms we can treat with Augmentin 875 mg 1 tab by mouth every 12 hrs for 5 days, if no symptoms then no TX.

## 2024-12-10 NOTE — TELEPHONE ENCOUNTER
Pt called back and I informed him of the message below on his urine results and he stated that he does not have any symptoms of a UTI at this time and I told him to just always drink plenty of fluids and keep his bowels soft and moving regularly and if he should develop any UTI symptoms he should call to report. Pt verbalized understanding and compliance.

## 2025-02-07 ENCOUNTER — TELEPHONE (OUTPATIENT)
Dept: SURGERY | Facility: CLINIC | Age: 45
End: 2025-02-07

## 2025-02-07 ENCOUNTER — LAB ENCOUNTER (OUTPATIENT)
Dept: LAB | Facility: HOSPITAL | Age: 45
End: 2025-02-07
Attending: UROLOGY
Payer: COMMERCIAL

## 2025-02-07 DIAGNOSIS — R39.15 URGENCY OF URINATION: ICD-10-CM

## 2025-02-07 DIAGNOSIS — R35.0 URINARY FREQUENCY: Primary | ICD-10-CM

## 2025-02-07 DIAGNOSIS — R35.0 URINARY FREQUENCY: ICD-10-CM

## 2025-02-07 LAB
BILIRUB UR QL: NEGATIVE
CLARITY UR: CLEAR
COLOR UR: COLORLESS
GLUCOSE UR-MCNC: 50 MG/DL
HGB UR QL STRIP.AUTO: NEGATIVE
KETONES UR-MCNC: NEGATIVE MG/DL
LEUKOCYTE ESTERASE UR QL STRIP.AUTO: NEGATIVE
NITRITE UR QL STRIP.AUTO: NEGATIVE
PH UR: 5 [PH] (ref 5–8)
PROT UR-MCNC: NEGATIVE MG/DL
SP GR UR STRIP: 1.01 (ref 1–1.03)
UROBILINOGEN UR STRIP-ACNC: NORMAL

## 2025-02-07 PROCEDURE — 87086 URINE CULTURE/COLONY COUNT: CPT

## 2025-02-07 PROCEDURE — 81003 URINALYSIS AUTO W/O SCOPE: CPT

## 2025-02-07 NOTE — TELEPHONE ENCOUNTER
Pt called back and I s/w him and determined that he thinks he may have a UTI. He states he did an at home dipstick and noted that it was positive for nitrites and leuks. I told pt that we really need him to go to one of our labs to submit a urine sample. I asked about his symptoms and he stated he has frequency and urgency and he had a low grade temp of 100.1 F. Denies any other UTI symptoms, HX of kidney stones or constipation issues. I told pt that I will place the orders in the system for a UA and C&S and we will not have the culture results for 48 hrs and will call with them. Pt verbalized understanding and compliance.

## 2025-02-07 NOTE — TELEPHONE ENCOUNTER
Per patient has uti symptoms again and asking if he can get an order for a urine test. Per patient he is going out of town in the morning. Please advise

## 2025-05-29 ENCOUNTER — TELEPHONE (OUTPATIENT)
Dept: SURGERY | Facility: CLINIC | Age: 45
End: 2025-05-29

## 2025-05-29 ENCOUNTER — LAB ENCOUNTER (OUTPATIENT)
Dept: LAB | Facility: HOSPITAL | Age: 45
End: 2025-05-29
Attending: UROLOGY
Payer: COMMERCIAL

## 2025-05-29 ENCOUNTER — MOBILE ENCOUNTER (OUTPATIENT)
Dept: SURGERY | Facility: CLINIC | Age: 45
End: 2025-05-29

## 2025-05-29 DIAGNOSIS — R30.0 DYSURIA: ICD-10-CM

## 2025-05-29 DIAGNOSIS — R30.0 DYSURIA: Primary | ICD-10-CM

## 2025-05-29 LAB
BILIRUB UR QL: NEGATIVE
COLOR UR: YELLOW
GLUCOSE UR-MCNC: 50 MG/DL
KETONES UR-MCNC: NEGATIVE MG/DL
LEUKOCYTE ESTERASE UR QL STRIP.AUTO: 500
PH UR: 5.5 [PH] (ref 5–8)
PROT UR-MCNC: 50 MG/DL
RBC #/AREA URNS AUTO: >10 /HPF
SP GR UR STRIP: >1.03 (ref 1–1.03)
UROBILINOGEN UR STRIP-ACNC: NORMAL
WBC #/AREA URNS AUTO: >50 /HPF

## 2025-05-29 PROCEDURE — 87186 SC STD MICRODIL/AGAR DIL: CPT

## 2025-05-29 PROCEDURE — 87086 URINE CULTURE/COLONY COUNT: CPT

## 2025-05-29 PROCEDURE — 81001 URINALYSIS AUTO W/SCOPE: CPT

## 2025-05-29 PROCEDURE — 87077 CULTURE AEROBIC IDENTIFY: CPT

## 2025-05-29 RX ORDER — SULFAMETHOXAZOLE AND TRIMETHOPRIM 800; 160 MG/1; MG/1
1 TABLET ORAL 2 TIMES DAILY
Qty: 14 TABLET | Refills: 0 | Status: SHIPPED | OUTPATIENT
Start: 2025-05-29 | End: 2025-06-05

## 2025-05-29 NOTE — TELEPHONE ENCOUNTER
- call center transferred call from pt  - pt c/o dysuria, urgency/frequency, and some groin discomfort  - pt denies fever/chills, hematuria  - UA/UC ordered, pt will go to lab after work  - pt denies further questions  - encounter complete

## 2025-05-29 NOTE — TELEPHONE ENCOUNTER
Patient calling stating he's experiencing UTI symptoms and would like for a urine culture to be place.Please advise

## 2025-05-30 NOTE — PROGRESS NOTES
Based on UA results from earlier today, Sent Bactrim DS po bid x 7 days to patient pharmacy. Urine culture results in process.     ZH

## 2025-06-02 ENCOUNTER — TELEPHONE (OUTPATIENT)
Dept: SURGERY | Facility: CLINIC | Age: 45
End: 2025-06-02

## 2025-06-02 ENCOUNTER — PATIENT MESSAGE (OUTPATIENT)
Dept: SURGERY | Facility: CLINIC | Age: 45
End: 2025-06-02

## 2025-06-02 ENCOUNTER — TELEPHONE (OUTPATIENT)
Facility: LOCATION | Age: 45
End: 2025-06-02

## 2025-06-02 NOTE — TELEPHONE ENCOUNTER
Per patient allergic to cefadroxil, needs different medication, states macrobid worked last time. Please advise thank you.

## 2025-06-02 NOTE — TELEPHONE ENCOUNTER
Per patient states he was prescribed cefadroxil by Leena Shukla, patient states he is allergic to the medication and is requesting an alternate. Per patient states he was previously prescribed Macrobid by Dr. Cooney previously and it had worked. Please call back.

## 2025-06-03 RX ORDER — NITROFURANTOIN 25; 75 MG/1; MG/1
100 CAPSULE ORAL 2 TIMES DAILY
Qty: 14 CAPSULE | Refills: 0 | Status: SHIPPED | OUTPATIENT
Start: 2025-06-03 | End: 2025-06-10

## 2025-06-03 NOTE — TELEPHONE ENCOUNTER
- attempted to call pt, got VM and left message and then call center contacted me with pt on the line, so was able to inform him that a new abx (macrobid) was sent to his pharmacy  - I also added pt's allergy and response to his chart, as it was not listed as an allergy  - pt acknowledged understanding and denied further questions  - encounter complete

## 2025-06-03 NOTE — TELEPHONE ENCOUNTER
Pt has not received a call back.  Pt is allergic to the medication.  Need the correct medication.

## 2025-06-15 ENCOUNTER — TELEPHONE (OUTPATIENT)
Dept: SURGERY | Facility: CLINIC | Age: 45
End: 2025-06-15

## 2025-06-15 DIAGNOSIS — R30.0 DYSURIA: Primary | ICD-10-CM

## 2025-06-15 RX ORDER — NITROFURANTOIN 25; 75 MG/1; MG/1
100 CAPSULE ORAL 2 TIMES DAILY
Qty: 14 CAPSULE | Refills: 0 | Status: SHIPPED | OUTPATIENT
Start: 2025-06-15 | End: 2025-06-22

## 2025-06-15 NOTE — TELEPHONE ENCOUNTER
Patient calling with recurrence of dysuria, frequency. No systemic signs of illness.   Chart reviewed.   Instructed patient to drop off urine culture; order placed. Will start macrobid based on previous culture.   All questions answered. Precautions given.

## 2025-06-17 ENCOUNTER — LAB ENCOUNTER (OUTPATIENT)
Dept: LAB | Facility: HOSPITAL | Age: 45
End: 2025-06-17
Attending: UROLOGY
Payer: COMMERCIAL

## 2025-06-17 DIAGNOSIS — R30.0 DYSURIA: ICD-10-CM

## 2025-06-17 PROCEDURE — 87086 URINE CULTURE/COLONY COUNT: CPT

## 2025-08-21 ENCOUNTER — HOSPITAL ENCOUNTER (OUTPATIENT)
Age: 45
Discharge: HOME | End: 2025-08-21
Attending: EMERGENCY MEDICINE

## 2025-08-21 VITALS
RESPIRATION RATE: 18 BRPM | TEMPERATURE: 98 F | DIASTOLIC BLOOD PRESSURE: 87 MMHG | OXYGEN SATURATION: 97 % | SYSTOLIC BLOOD PRESSURE: 147 MMHG | HEART RATE: 97 BPM

## 2025-08-21 DIAGNOSIS — J02.9 ACUTE VIRAL PHARYNGITIS: Primary | ICD-10-CM

## 2025-08-21 LAB — S PYO AG THROAT QL IA.RAPID: NEGATIVE

## 2025-08-21 PROCEDURE — 99212 OFFICE O/P EST SF 10 MIN: CPT

## 2025-08-21 PROCEDURE — 99213 OFFICE O/P EST LOW 20 MIN: CPT

## 2025-08-21 PROCEDURE — 87651 STREP A DNA AMP PROBE: CPT | Performed by: EMERGENCY MEDICINE
